# Patient Record
Sex: MALE | Race: WHITE | NOT HISPANIC OR LATINO | Employment: OTHER | URBAN - METROPOLITAN AREA
[De-identification: names, ages, dates, MRNs, and addresses within clinical notes are randomized per-mention and may not be internally consistent; named-entity substitution may affect disease eponyms.]

---

## 2017-12-14 ENCOUNTER — TRANSCRIBE ORDERS (OUTPATIENT)
Dept: ADMINISTRATIVE | Facility: HOSPITAL | Age: 77
End: 2017-12-14

## 2017-12-14 DIAGNOSIS — R06.09 DOE (DYSPNEA ON EXERTION): Primary | ICD-10-CM

## 2018-01-04 ENCOUNTER — HOSPITAL ENCOUNTER (OUTPATIENT)
Dept: CARDIOLOGY | Facility: HOSPITAL | Age: 78
Discharge: HOME | End: 2018-01-04
Attending: FAMILY MEDICINE | Admitting: FAMILY MEDICINE

## 2018-01-04 VITALS
BODY MASS INDEX: 31.5 KG/M2 | SYSTOLIC BLOOD PRESSURE: 130 MMHG | HEART RATE: 63 BPM | DIASTOLIC BLOOD PRESSURE: 88 MMHG | HEIGHT: 70 IN | WEIGHT: 220 LBS

## 2018-01-04 DIAGNOSIS — R06.09 DOE (DYSPNEA ON EXERTION): ICD-10-CM

## 2018-01-04 LAB
BH CV ECHO MEAS - AO MAX PG (FULL): 3.5 MMHG
BH CV ECHO MEAS - AO MAX PG: 5.5 MMHG
BH CV ECHO MEAS - AO MEAN PG (FULL): 2 MMHG
BH CV ECHO MEAS - AO MEAN PG: 3 MMHG
BH CV ECHO MEAS - AO ROOT AREA (BSA CORRECTED): 1.5
BH CV ECHO MEAS - AO ROOT AREA: 8.6 CM^2
BH CV ECHO MEAS - AO ROOT DIAM: 3.3 CM
BH CV ECHO MEAS - AO V2 MAX: 117 CM/SEC
BH CV ECHO MEAS - AO V2 MEAN: 83 CM/SEC
BH CV ECHO MEAS - AO V2 VTI: 22.9 CM
BH CV ECHO MEAS - AVA(I,A): 3.3 CM^2
BH CV ECHO MEAS - AVA(I,D): 3.3 CM^2
BH CV ECHO MEAS - AVA(V,A): 3 CM^2
BH CV ECHO MEAS - AVA(V,D): 3 CM^2
BH CV ECHO MEAS - BSA(HAYCOCK): 2.2 M^2
BH CV ECHO MEAS - BSA: 2.2 M^2
BH CV ECHO MEAS - BZI_BMI: 31.6 KILOGRAMS/M^2
BH CV ECHO MEAS - BZI_METRIC_HEIGHT: 177.8 CM
BH CV ECHO MEAS - BZI_METRIC_WEIGHT: 99.8 KG
BH CV ECHO MEAS - EDV(CUBED): 164.6 ML
BH CV ECHO MEAS - EDV(TEICH): 146.2 ML
BH CV ECHO MEAS - EF(CUBED): 71.4 %
BH CV ECHO MEAS - EF(TEICH): 62.5 %
BH CV ECHO MEAS - ESV(CUBED): 47 ML
BH CV ECHO MEAS - ESV(TEICH): 54.8 ML
BH CV ECHO MEAS - FS: 34.1 %
BH CV ECHO MEAS - IVS/LVPW: 1.1
BH CV ECHO MEAS - IVSD: 1.2 CM
BH CV ECHO MEAS - LA DIMENSION: 4.7 CM
BH CV ECHO MEAS - LA/AO: 1.4
BH CV ECHO MEAS - LAT PEAK E' VEL: 7.2 CM/SEC
BH CV ECHO MEAS - LV MASS(C)D: 260 GRAMS
BH CV ECHO MEAS - LV MASS(C)DI: 119.6 GRAMS/M^2
BH CV ECHO MEAS - LV MAX PG: 2 MMHG
BH CV ECHO MEAS - LV MEAN PG: 1 MMHG
BH CV ECHO MEAS - LV V1 MAX: 70.6 CM/SEC
BH CV ECHO MEAS - LV V1 MEAN: 47 CM/SEC
BH CV ECHO MEAS - LV V1 VTI: 15.4 CM
BH CV ECHO MEAS - LVIDD: 5.5 CM
BH CV ECHO MEAS - LVIDS: 3.6 CM
BH CV ECHO MEAS - LVOT AREA (M): 4.9 CM^2
BH CV ECHO MEAS - LVOT AREA: 4.9 CM^2
BH CV ECHO MEAS - LVOT DIAM: 2.5 CM
BH CV ECHO MEAS - LVPWD: 1.1 CM
BH CV ECHO MEAS - MED PEAK E' VEL: 3.89 CM/SEC
BH CV ECHO MEAS - MV A MAX VEL: 78 CM/SEC
BH CV ECHO MEAS - MV E MAX VEL: 59.2 CM/SEC
BH CV ECHO MEAS - MV E/A: 0.76
BH CV ECHO MEAS - PA ACC SLOPE: 787 CM/SEC^2
BH CV ECHO MEAS - PA ACC TIME: 0.12 SEC
BH CV ECHO MEAS - PA MAX PG: 7.8 MMHG
BH CV ECHO MEAS - PA PR(ACCEL): 25 MMHG
BH CV ECHO MEAS - PA V2 MAX: 140 CM/SEC
BH CV ECHO MEAS - SI(AO): 90.1 ML/M^2
BH CV ECHO MEAS - SI(CUBED): 54.1 ML/M^2
BH CV ECHO MEAS - SI(LVOT): 34.8 ML/M^2
BH CV ECHO MEAS - SI(TEICH): 42 ML/M^2
BH CV ECHO MEAS - SV(AO): 195.9 ML
BH CV ECHO MEAS - SV(CUBED): 117.5 ML
BH CV ECHO MEAS - SV(LVOT): 75.6 ML
BH CV ECHO MEAS - SV(TEICH): 91.4 ML
BH CV ECHO MEAS - TAPSE (>1.6): 2.4 CM2
BH CV STRESS BP STAGE 1: NORMAL
BH CV STRESS DURATION MIN STAGE 1: 3
BH CV STRESS DURATION MIN STAGE 2: 3
BH CV STRESS DURATION SEC STAGE 1: 0
BH CV STRESS DURATION SEC STAGE 2: 0
BH CV STRESS ECHO POST STRESS EJECTION FRACTION EF: 45 %
BH CV STRESS GRADE STAGE 1: 10
BH CV STRESS GRADE STAGE 2: 12
BH CV STRESS HR STAGE 1: 116
BH CV STRESS HR STAGE 2: 137
BH CV STRESS METS STAGE 1: 5
BH CV STRESS METS STAGE 2: 7.5
BH CV STRESS O2 STAGE 1: 96
BH CV STRESS O2 STAGE 2: 95
BH CV STRESS PROTOCOL 1: NORMAL
BH CV STRESS RECOVERY BP: NORMAL MMHG
BH CV STRESS RECOVERY HR: 68 BPM
BH CV STRESS RECOVERY O2: 98 %
BH CV STRESS SPEED STAGE 1: 1.7
BH CV STRESS SPEED STAGE 2: 2.5
BH CV STRESS STAGE 1: 1
BH CV STRESS STAGE 2: 2
BH CV VAS BP RIGHT ARM: NORMAL MMHG
BH CV XLRA - RV BASE: 3 CM
BH CV XLRA - RV LENGTH: 8.1 CM
BH CV XLRA - RV MID: 3.4 CM
BH CV XLRA - TDI S': 11.8 CM/SEC
E/E' RATIO: 8.2
LEFT ATRIUM VOLUME INDEX: 42 ML/M2
LV EF 2D ECHO EST: 55 %
PERCENT MAX PREDICTED HR: 95.1 %
STRESS BASELINE BP: NORMAL MMHG
STRESS BASELINE HR: 63 BPM
STRESS O2 SAT REST: 96 %
STRESS PERCENT HR: 112 %
STRESS POST ESTIMATED WORKLOAD: 7 METS
STRESS POST EXERCISE DUR MIN: 5 MIN
STRESS POST EXERCISE DUR SEC: 40 SEC
STRESS POST O2 SAT PEAK: 95 %
STRESS POST PEAK BP: NORMAL MMHG
STRESS POST PEAK HR: 136 BPM

## 2018-01-04 PROCEDURE — 93325 DOPPLER ECHO COLOR FLOW MAPG: CPT

## 2018-01-04 PROCEDURE — 93320 DOPPLER ECHO COMPLETE: CPT

## 2018-01-04 PROCEDURE — 93320 DOPPLER ECHO COMPLETE: CPT | Performed by: INTERNAL MEDICINE

## 2018-01-04 PROCEDURE — 93352 ADMIN ECG CONTRAST AGENT: CPT | Performed by: INTERNAL MEDICINE

## 2018-01-04 PROCEDURE — 93350 STRESS TTE ONLY: CPT | Performed by: INTERNAL MEDICINE

## 2018-01-04 PROCEDURE — 93325 DOPPLER ECHO COLOR FLOW MAPG: CPT | Performed by: INTERNAL MEDICINE

## 2018-01-04 PROCEDURE — 93350 STRESS TTE ONLY: CPT

## 2018-01-04 PROCEDURE — 93017 CV STRESS TEST TRACING ONLY: CPT

## 2018-01-04 PROCEDURE — 25010000002 SULFUR HEXAFLUORIDE MICROSPH 60.7-25 MG RECONSTITUTED SUSPENSION: Performed by: FAMILY MEDICINE

## 2018-01-04 PROCEDURE — 93018 CV STRESS TEST I&R ONLY: CPT | Performed by: INTERNAL MEDICINE

## 2018-01-04 RX ORDER — ATORVASTATIN CALCIUM 20 MG/1
10 TABLET, FILM COATED ORAL DAILY
COMMUNITY

## 2018-01-04 RX ORDER — MELOXICAM 15 MG/1
7.5 TABLET ORAL DAILY
COMMUNITY
End: 2020-08-04

## 2018-01-04 RX ORDER — LISINOPRIL 10 MG/1
10 TABLET ORAL DAILY
Status: ON HOLD | COMMUNITY
End: 2018-01-30

## 2018-01-04 RX ORDER — TAMSULOSIN HYDROCHLORIDE 0.4 MG/1
1 CAPSULE ORAL NIGHTLY
COMMUNITY
End: 2020-08-04

## 2018-01-04 RX ORDER — GLYBURIDE 5 MG/1
5 TABLET ORAL
COMMUNITY
End: 2020-08-04

## 2018-01-04 RX ADMIN — SULFUR HEXAFLUORIDE 5 ML: KIT at 14:47

## 2018-01-23 ENCOUNTER — OFFICE VISIT (OUTPATIENT)
Dept: CARDIOLOGY | Facility: CLINIC | Age: 78
End: 2018-01-23

## 2018-01-23 VITALS
HEIGHT: 71 IN | BODY MASS INDEX: 32.76 KG/M2 | HEART RATE: 60 BPM | WEIGHT: 234 LBS | SYSTOLIC BLOOD PRESSURE: 158 MMHG | DIASTOLIC BLOOD PRESSURE: 97 MMHG

## 2018-01-23 DIAGNOSIS — R94.39 ABNORMAL STRESS ECHO: Primary | ICD-10-CM

## 2018-01-23 DIAGNOSIS — R06.09 DYSPNEA ON EXERTION: ICD-10-CM

## 2018-01-23 PROCEDURE — 93000 ELECTROCARDIOGRAM COMPLETE: CPT | Performed by: INTERNAL MEDICINE

## 2018-01-23 PROCEDURE — 99204 OFFICE O/P NEW MOD 45 MIN: CPT | Performed by: INTERNAL MEDICINE

## 2018-01-23 RX ORDER — ALBUTEROL SULFATE 90 UG/1
1 AEROSOL, METERED RESPIRATORY (INHALATION) AS NEEDED
COMMUNITY
Start: 2017-12-22

## 2018-01-23 NOTE — PROGRESS NOTES
[facility] at [facility]  Consultation H&P  Scotty Gore  1940  [phone]    VISIT DATE:  01/23/2018    PCP: MEE Velez MD  [address] [address]    CC:  Chief Complaint   Patient presents with   • Abnormal stress test     New Patient    • Shortness of Breath       ASSESSMENT:   Diagnosis Plan   1. Abnormal stress echo  Case Request Cath Lab: Left Heart Cath   2. Dyspnea on exertion  Case Request Cath Lab: Left Heart Cath       PLAN:  Heart catheterization for definitive evaluation of coronary anatomy: Reassuring cardiac catheterization in 2014 however recent stress echocardiogram concerning for either ostial LAD or even potentially left main disease given drop in LV systolic function at peak exercise with anterior and apical regional wall motion abnormalities.    Chronic diastolic congestive heart failure: Diastolic dysfunction noted on resting transthoracic imaging.  May be at least partially responsible for some of his baseline dyspnea on exertion.  Continue afterload reduction.  Currently appears euvolemic.    History of Present Illness   77-year-old diabetic gentleman use had progressive dyspnea on exertion over the previous 3-4 months.  Previously had stable shortness of breath and a class II pattern but this is recently progressed.  He notices limiting dyspnea on exertion with any incline or going up even half a flight of stairs.  Denies chest discomfort.  No palpitations.  No PND or orthopnea.  He is compliant with medical therapy for diabetes and hypertension.  The pressures typically run less than 140/90 mmHg at home and his primary care physician's office.  He underwent exercise echocardiogram which revealed normal regional augmentation at rest however he developed a drop in his overall systolic function peak exercise with hypokinesis of his anterior wall and apex.    PHYSICAL EXAMINATION:  Vitals:    01/23/18 0901   BP: 158/97   BP Location: Right  "arm   Patient Position: Sitting   Pulse: 60   Weight: 106 kg (234 lb)   Height: 180.3 cm (71\")     General Appearance:    Alert, cooperative, no distress, appears stated age   Head:    Normocephalic, without obvious abnormality, atraumatic   Eyes:    conjunctiva/corneas clear, EOM's intact, fundi     benign, both eyes   Ears:    Normal TM's and external ear canals, both ears   Nose:   Nares normal, septum midline, mucosa normal, no drainage    or sinus tenderness   Throat:   Lips, mucosa, and tongue normal; teeth and gums normal   Neck:   Supple, symmetrical, trachea midline, no adenopathy;     thyroid:  no enlargement/tenderness/nodules; no carotid    bruit or JVD   Back:     Symmetric, no curvature, ROM normal, no CVA tenderness   Lungs:     Clear to auscultation bilaterally, respirations unlabored   Chest Wall:    No tenderness or deformity    Heart:    Regular rate and rhythm, S1 and S2 normal, no murmur, rub   or gallop, normal carotid impulse bilaterally without bruit.   Abdomen:     Soft, non-tender, bowel sounds active all four quadrants,     no masses, no organomegaly   Extremities:   Extremities normal, atraumatic, no cyanosis or edema   Pulses:   2+ and symmetric all extremities   Skin:   Skin color, texture, turgor normal, no rashes or lesions   Lymph nodes:   Cervical, supraclavicular, and axillary nodes normal   Neurologic:   normal strength, sensation intact     throughout       Diagnostic Data:    ECG 12 Lead  Date/Time: 1/23/2018 9:23 AM  Performed by: EDIE JAMES III  Authorized by: EDIE JAMES III   Previous ECG: no previous ECG available  Rhythm: sinus rhythm  Other findings: LVH with strain  Clinical impression: abnormal ECG          Lab Results   Component Value Date    CHLPL 135 10/09/2014    TRIG 143 10/09/2014    HDL 31 (L) 10/09/2014    LDLDIRECT 87 10/09/2014     Lab Results   Component Value Date    GLUCOSE 92 10/09/2014    BUN 16 10/09/2014    CREATININE 0.9 10/09/2014     " 10/09/2014    K 4.0 10/09/2014     10/09/2014    CO2 29 10/09/2014     Lab Results   Component Value Date    HGBA1C 7.1 (H) 10/09/2014     Lab Results   Component Value Date    WBC 6.92 10/09/2014    HGB 12.3 (L) 10/09/2014    HCT 37.6 (L) 10/09/2014     10/09/2014       PROBLEM LIST:  Patient Active Problem List   Diagnosis   • Dyspnea on exertion   • Abnormal stress echo       PAST MEDICAL HX  Past Medical History:   Diagnosis Date   • Arthritis    • Asthma    • Diabetes mellitus        Allergies  No Known Allergies    Current Medications    Current Outpatient Prescriptions:   •  atorvastatin (LIPITOR) 20 MG tablet, Take 20 mg by mouth Daily., Disp: , Rfl:   •  BREO ELLIPTA 100-25 MCG/INH aerosol powder , As Needed., Disp: , Rfl:   •  glyBURIDE (DIAbeta) 5 MG tablet, Take 5 mg by mouth Daily With Breakfast., Disp: , Rfl:   •  lisinopril (PRINIVIL,ZESTRIL) 10 MG tablet, Take 10 mg by mouth Daily., Disp: , Rfl:   •  meloxicam (MOBIC) 15 MG tablet, Take 15 mg by mouth Daily., Disp: , Rfl:   •  metFORMIN (GLUCOPHAGE) 500 MG tablet, Take 1,000 mg by mouth 2 (Two) Times a Day With Meals., Disp: , Rfl:   •  Multiple Vitamins-Minerals (ICAPS AREDS 2 PO), Take  by mouth 2 (Two) Times a Day., Disp: , Rfl:   •  tamsulosin (FLOMAX) 0.4 MG capsule 24 hr capsule, Take 1 capsule by mouth Every Night., Disp: , Rfl:   •  VENTOLIN  (90 Base) MCG/ACT inhaler, As Needed., Disp: , Rfl:          ROS  Review of Systems   Constitution: Positive for weakness. Negative for malaise/fatigue.   Cardiovascular: Positive for dyspnea on exertion and orthopnea. Negative for chest pain, irregular heartbeat, leg swelling, near-syncope, palpitations and syncope.   Respiratory: Positive for shortness of breath. Negative for cough, hemoptysis, snoring and sputum production.    Musculoskeletal: Positive for arthritis.     All other body systems reviewed and are negative    SOCIAL HX  Social History     Social History   • Marital  status:      Spouse name: N/A   • Number of children: N/A   • Years of education: N/A     Occupational History   • Not on file.     Social History Main Topics   • Smoking status: Never Smoker   • Smokeless tobacco: Never Used   • Alcohol use No   • Drug use: No   • Sexual activity: Defer     Other Topics Concern   • Not on file     Social History Narrative       FAMILY HX  History reviewed. No pertinent family history.          Bradley Sandhu III, MD, FACC

## 2018-01-26 ENCOUNTER — PREP FOR SURGERY (OUTPATIENT)
Dept: OTHER | Facility: HOSPITAL | Age: 78
End: 2018-01-26

## 2018-01-26 DIAGNOSIS — R94.39 ABNORMAL MYOCARDIAL PERFUSION STUDY: Primary | ICD-10-CM

## 2018-01-26 DIAGNOSIS — E13.65 OTHER SPECIFIED DIABETES MELLITUS WITH HYPERGLYCEMIA, WITHOUT LONG-TERM CURRENT USE OF INSULIN (HCC): ICD-10-CM

## 2018-01-26 RX ORDER — SODIUM CHLORIDE 0.9 % (FLUSH) 0.9 %
1-10 SYRINGE (ML) INJECTION AS NEEDED
Status: CANCELLED | OUTPATIENT
Start: 2018-01-26

## 2018-01-28 ENCOUNTER — APPOINTMENT (OUTPATIENT)
Dept: PREADMISSION TESTING | Facility: HOSPITAL | Age: 78
End: 2018-01-28

## 2018-01-28 LAB
ALBUMIN SERPL-MCNC: 4 G/DL (ref 3.2–4.8)
ALBUMIN/GLOB SERPL: 1.5 G/DL (ref 1.5–2.5)
ALP SERPL-CCNC: 75 U/L (ref 25–100)
ALT SERPL W P-5'-P-CCNC: 32 U/L (ref 7–40)
ANION GAP SERPL CALCULATED.3IONS-SCNC: 6 MMOL/L (ref 3–11)
AST SERPL-CCNC: 34 U/L (ref 0–33)
BILIRUB SERPL-MCNC: 0.6 MG/DL (ref 0.3–1.2)
BUN BLD-MCNC: 14 MG/DL (ref 9–23)
BUN/CREAT SERPL: 14 (ref 7–25)
CALCIUM SPEC-SCNC: 9.4 MG/DL (ref 8.7–10.4)
CHLORIDE SERPL-SCNC: 108 MMOL/L (ref 99–109)
CO2 SERPL-SCNC: 28 MMOL/L (ref 20–31)
CREAT BLD-MCNC: 1 MG/DL (ref 0.6–1.3)
DEPRECATED RDW RBC AUTO: 47.7 FL (ref 37–54)
ERYTHROCYTE [DISTWIDTH] IN BLOOD BY AUTOMATED COUNT: 15.8 % (ref 11.3–14.5)
GFR SERPL CREATININE-BSD FRML MDRD: 72 ML/MIN/1.73
GFR SERPL CREATININE-BSD FRML MDRD: 88 ML/MIN/1.73
GLOBULIN UR ELPH-MCNC: 2.7 GM/DL
GLUCOSE BLD-MCNC: 66 MG/DL (ref 70–100)
HBA1C MFR BLD: 7.8 % (ref 4.8–5.6)
HCT VFR BLD AUTO: 37.5 % (ref 38.9–50.9)
HGB BLD-MCNC: 12.4 G/DL (ref 13.1–17.5)
MCH RBC QN AUTO: 27.1 PG (ref 27–31)
MCHC RBC AUTO-ENTMCNC: 33.1 G/DL (ref 32–36)
MCV RBC AUTO: 81.9 FL (ref 80–99)
PLATELET # BLD AUTO: 170 10*3/MM3 (ref 150–450)
PMV BLD AUTO: 11.1 FL (ref 6–12)
POTASSIUM BLD-SCNC: 4.3 MMOL/L (ref 3.5–5.5)
PROT SERPL-MCNC: 6.7 G/DL (ref 5.7–8.2)
RBC # BLD AUTO: 4.58 10*6/MM3 (ref 4.2–5.76)
SODIUM BLD-SCNC: 142 MMOL/L (ref 132–146)
WBC NRBC COR # BLD: 6.06 10*3/MM3 (ref 3.5–10.8)

## 2018-01-28 PROCEDURE — 85027 COMPLETE CBC AUTOMATED: CPT | Performed by: PHYSICIAN ASSISTANT

## 2018-01-28 PROCEDURE — 80053 COMPREHEN METABOLIC PANEL: CPT | Performed by: PHYSICIAN ASSISTANT

## 2018-01-28 PROCEDURE — 83036 HEMOGLOBIN GLYCOSYLATED A1C: CPT | Performed by: PHYSICIAN ASSISTANT

## 2018-01-28 PROCEDURE — 36415 COLL VENOUS BLD VENIPUNCTURE: CPT | Performed by: PHYSICIAN ASSISTANT

## 2018-01-28 NOTE — DISCHARGE INSTRUCTIONS
The following instructions given during Pre Admission Testing visit:    Do not eat or drink anything after MN except for sips of water with your a.m. Prescription meds unless otherwise instructed by your physician.    Glasses and jewelry may be worn, but dentures must be removed prior to cath/procedure.    Leave any items you consider valuable at home.    Family members may wait in [facility] waiting area during procedure.    Bring all medications in their original containers the day of procedure.    Bring photo ID and insurance cards on the day of procedure.    Need to make arrangements for transportation prior to discharge.    The following handouts were given:     Heart Cath pathway (if applicable)   Cardiac Cath booklet published by [facility]    OR appropriate [facility] procedure booklet    If applicable, pt instructed to bring CPAP mask and tubing the day of procedure.

## 2018-01-30 ENCOUNTER — HOSPITAL ENCOUNTER (OUTPATIENT)
Facility: HOSPITAL | Age: 78
Setting detail: HOSPITAL OUTPATIENT SURGERY
Discharge: HOME | End: 2018-01-30
Attending: INTERNAL MEDICINE | Admitting: INTERNAL MEDICINE

## 2018-01-30 VITALS
OXYGEN SATURATION: 94 % | RESPIRATION RATE: 20 BRPM | TEMPERATURE: 97.8 F | BODY MASS INDEX: 33.46 KG/M2 | WEIGHT: 233.69 LBS | HEART RATE: 54 BPM | DIASTOLIC BLOOD PRESSURE: 94 MMHG | SYSTOLIC BLOOD PRESSURE: 176 MMHG | HEIGHT: 70 IN

## 2018-01-30 DIAGNOSIS — E13.65 OTHER SPECIFIED DIABETES MELLITUS WITH HYPERGLYCEMIA, WITHOUT LONG-TERM CURRENT USE OF INSULIN (HCC): ICD-10-CM

## 2018-01-30 DIAGNOSIS — R94.39 ABNORMAL MYOCARDIAL PERFUSION STUDY: ICD-10-CM

## 2018-01-30 DIAGNOSIS — R06.09 DYSPNEA ON EXERTION: ICD-10-CM

## 2018-01-30 DIAGNOSIS — R94.39 ABNORMAL STRESS ECHO: ICD-10-CM

## 2018-01-30 LAB
ARTICHOKE IGE QN: 92 MG/DL (ref 0–130)
CHOLEST SERPL-MCNC: 145 MG/DL (ref 0–200)
GLUCOSE BLDC GLUCOMTR-MCNC: 110 MG/DL (ref 70–130)
HDLC SERPL-MCNC: 43 MG/DL (ref 40–60)
TRIGL SERPL-MCNC: 91 MG/DL (ref 0–150)

## 2018-01-30 PROCEDURE — C1769 GUIDE WIRE: HCPCS | Performed by: INTERNAL MEDICINE

## 2018-01-30 PROCEDURE — G0108 DIAB MANAGE TRN  PER INDIV: HCPCS | Performed by: REGISTERED NURSE

## 2018-01-30 PROCEDURE — 36415 COLL VENOUS BLD VENIPUNCTURE: CPT

## 2018-01-30 PROCEDURE — C1894 INTRO/SHEATH, NON-LASER: HCPCS | Performed by: INTERNAL MEDICINE

## 2018-01-30 PROCEDURE — 25010000002 FENTANYL CITRATE (PF) 100 MCG/2ML SOLUTION: Performed by: INTERNAL MEDICINE

## 2018-01-30 PROCEDURE — 93458 L HRT ARTERY/VENTRICLE ANGIO: CPT | Performed by: INTERNAL MEDICINE

## 2018-01-30 PROCEDURE — 25010000002 MIDAZOLAM PER 1 MG: Performed by: INTERNAL MEDICINE

## 2018-01-30 PROCEDURE — 25010000002 HEPARIN (PORCINE) PER 1000 UNITS: Performed by: INTERNAL MEDICINE

## 2018-01-30 PROCEDURE — 82962 GLUCOSE BLOOD TEST: CPT

## 2018-01-30 PROCEDURE — 80061 LIPID PANEL: CPT | Performed by: PHYSICIAN ASSISTANT

## 2018-01-30 RX ORDER — LISINOPRIL 10 MG/1
10 TABLET ORAL 2 TIMES DAILY
Qty: 60 TABLET | Refills: 5 | Status: SHIPPED | OUTPATIENT
Start: 2018-01-30 | End: 2018-08-06 | Stop reason: SDUPTHER

## 2018-01-30 RX ORDER — OXYBUTYNIN CHLORIDE 5 MG/1
5 TABLET ORAL DAILY
COMMUNITY
End: 2020-08-04

## 2018-01-30 RX ORDER — LIDOCAINE HYDROCHLORIDE 10 MG/ML
INJECTION, SOLUTION EPIDURAL; INFILTRATION; INTRACAUDAL; PERINEURAL AS NEEDED
Status: DISCONTINUED | OUTPATIENT
Start: 2018-01-30 | End: 2018-01-30 | Stop reason: HOSPADM

## 2018-01-30 RX ORDER — FENTANYL CITRATE 50 UG/ML
INJECTION, SOLUTION INTRAMUSCULAR; INTRAVENOUS AS NEEDED
Status: DISCONTINUED | OUTPATIENT
Start: 2018-01-30 | End: 2018-01-30 | Stop reason: HOSPADM

## 2018-01-30 RX ORDER — SODIUM CHLORIDE 0.9 % (FLUSH) 0.9 %
1-10 SYRINGE (ML) INJECTION AS NEEDED
Status: DISCONTINUED | OUTPATIENT
Start: 2018-01-30 | End: 2018-01-30 | Stop reason: HOSPADM

## 2018-01-30 RX ORDER — ASPIRIN 81 MG/1
81 TABLET ORAL DAILY
COMMUNITY
End: 2020-08-04

## 2018-01-30 RX ORDER — SODIUM CHLORIDE 9 MG/ML
INJECTION, SOLUTION INTRAVENOUS CONTINUOUS PRN
Status: DISCONTINUED | OUTPATIENT
Start: 2018-01-30 | End: 2018-01-30 | Stop reason: HOSPADM

## 2018-01-30 RX ORDER — MIDAZOLAM HYDROCHLORIDE 1 MG/ML
INJECTION INTRAMUSCULAR; INTRAVENOUS AS NEEDED
Status: DISCONTINUED | OUTPATIENT
Start: 2018-01-30 | End: 2018-01-30 | Stop reason: HOSPADM

## 2018-01-31 ENCOUNTER — DOCUMENTATION (OUTPATIENT)
Dept: CARDIAC REHAB | Facility: HOSPITAL | Age: 78
End: 2018-01-31

## 2018-08-06 RX ORDER — LISINOPRIL 10 MG/1
10 TABLET ORAL 2 TIMES DAILY
Qty: 180 TABLET | Refills: 0 | Status: SHIPPED | OUTPATIENT
Start: 2018-08-06 | End: 2018-10-09 | Stop reason: SDUPTHER

## 2018-10-10 RX ORDER — LISINOPRIL 10 MG/1
TABLET ORAL
Qty: 60 TABLET | Refills: 5 | Status: SHIPPED | OUTPATIENT
Start: 2018-10-10 | End: 2020-08-04

## 2020-08-04 ENCOUNTER — APPOINTMENT (OUTPATIENT)
Dept: PREADMISSION TESTING | Facility: HOSPITAL | Age: 80
End: 2020-08-04

## 2020-08-04 ENCOUNTER — HOSPITAL ENCOUNTER (OUTPATIENT)
Dept: GENERAL RADIOLOGY | Facility: HOSPITAL | Age: 80
Discharge: HOME | End: 2020-08-04
Admitting: ORTHOPAEDIC SURGERY

## 2020-08-04 VITALS — BODY MASS INDEX: 31.64 KG/M2 | WEIGHT: 221 LBS | HEIGHT: 70 IN

## 2020-08-04 LAB
ALBUMIN SERPL-MCNC: 4.1 G/DL (ref 3.5–5.2)
ALBUMIN/GLOB SERPL: 1.7 G/DL
ALP SERPL-CCNC: 80 U/L (ref 39–117)
ALT SERPL W P-5'-P-CCNC: 24 U/L (ref 1–41)
ANION GAP SERPL CALCULATED.3IONS-SCNC: 9 MMOL/L (ref 5–15)
APTT PPP: 28.9 SECONDS (ref 24–37)
AST SERPL-CCNC: 30 U/L (ref 1–40)
BASOPHILS # BLD AUTO: 0.04 10*3/MM3 (ref 0–0.2)
BASOPHILS NFR BLD AUTO: 0.8 % (ref 0–1.5)
BILIRUB SERPL-MCNC: 0.3 MG/DL (ref 0–1.2)
BILIRUB UR QL STRIP: NEGATIVE
BUN SERPL-MCNC: 17 MG/DL (ref 8–23)
BUN/CREAT SERPL: 13.3 (ref 7–25)
CALCIUM SPEC-SCNC: 9.6 MG/DL (ref 8.6–10.5)
CHLORIDE SERPL-SCNC: 106 MMOL/L (ref 98–107)
CLARITY UR: CLEAR
CO2 SERPL-SCNC: 25 MMOL/L (ref 22–29)
COLOR UR: YELLOW
CREAT SERPL-MCNC: 1.28 MG/DL (ref 0.76–1.27)
DEPRECATED RDW RBC AUTO: 47.9 FL (ref 37–54)
EOSINOPHIL # BLD AUTO: 0.14 10*3/MM3 (ref 0–0.4)
EOSINOPHIL NFR BLD AUTO: 2.7 % (ref 0.3–6.2)
ERYTHROCYTE [DISTWIDTH] IN BLOOD BY AUTOMATED COUNT: 14.5 % (ref 12.3–15.4)
GFR SERPL CREATININE-BSD FRML MDRD: 54 ML/MIN/1.73
GFR SERPL CREATININE-BSD FRML MDRD: 66 ML/MIN/1.73
GLOBULIN UR ELPH-MCNC: 2.4 GM/DL
GLUCOSE SERPL-MCNC: 156 MG/DL (ref 65–99)
GLUCOSE UR STRIP-MCNC: NEGATIVE MG/DL
HBA1C MFR BLD: 9.9 % (ref 4.8–5.6)
HCT VFR BLD AUTO: 40.9 % (ref 37.5–51)
HGB BLD-MCNC: 13 G/DL (ref 13–17.7)
HGB UR QL STRIP.AUTO: NEGATIVE
IMM GRANULOCYTES # BLD AUTO: 0.03 10*3/MM3 (ref 0–0.05)
IMM GRANULOCYTES NFR BLD AUTO: 0.6 % (ref 0–0.5)
INR PPP: 1.11 (ref 0.85–1.16)
KETONES UR QL STRIP: ABNORMAL
LEUKOCYTE ESTERASE UR QL STRIP.AUTO: NEGATIVE
LYMPHOCYTES # BLD AUTO: 1.27 10*3/MM3 (ref 0.7–3.1)
LYMPHOCYTES NFR BLD AUTO: 24.5 % (ref 19.6–45.3)
MCH RBC QN AUTO: 28.8 PG (ref 26.6–33)
MCHC RBC AUTO-ENTMCNC: 31.8 G/DL (ref 31.5–35.7)
MCV RBC AUTO: 90.5 FL (ref 79–97)
MONOCYTES # BLD AUTO: 0.52 10*3/MM3 (ref 0.1–0.9)
MONOCYTES NFR BLD AUTO: 10 % (ref 5–12)
NEUTROPHILS NFR BLD AUTO: 3.19 10*3/MM3 (ref 1.7–7)
NEUTROPHILS NFR BLD AUTO: 61.4 % (ref 42.7–76)
NITRITE UR QL STRIP: NEGATIVE
NRBC BLD AUTO-RTO: 0 /100 WBC (ref 0–0.2)
PH UR STRIP.AUTO: <=5 [PH] (ref 5–8)
PLATELET # BLD AUTO: 167 10*3/MM3 (ref 140–450)
PMV BLD AUTO: 11.3 FL (ref 6–12)
POTASSIUM SERPL-SCNC: 4.6 MMOL/L (ref 3.5–5.2)
PROT SERPL-MCNC: 6.5 G/DL (ref 6–8.5)
PROT UR QL STRIP: NEGATIVE
PROTHROMBIN TIME: 14 SECONDS (ref 11.5–14)
RBC # BLD AUTO: 4.52 10*6/MM3 (ref 4.14–5.8)
SODIUM SERPL-SCNC: 140 MMOL/L (ref 136–145)
SP GR UR STRIP: 1.03 (ref 1–1.03)
UROBILINOGEN UR QL STRIP: ABNORMAL
WBC # BLD AUTO: 5.19 10*3/MM3 (ref 3.4–10.8)

## 2020-08-04 PROCEDURE — 93005 ELECTROCARDIOGRAM TRACING: CPT

## 2020-08-04 PROCEDURE — 85025 COMPLETE CBC W/AUTO DIFF WBC: CPT | Performed by: ORTHOPAEDIC SURGERY

## 2020-08-04 PROCEDURE — 85610 PROTHROMBIN TIME: CPT | Performed by: ORTHOPAEDIC SURGERY

## 2020-08-04 PROCEDURE — 83036 HEMOGLOBIN GLYCOSYLATED A1C: CPT | Performed by: ORTHOPAEDIC SURGERY

## 2020-08-04 PROCEDURE — 85730 THROMBOPLASTIN TIME PARTIAL: CPT | Performed by: ORTHOPAEDIC SURGERY

## 2020-08-04 PROCEDURE — 81003 URINALYSIS AUTO W/O SCOPE: CPT | Performed by: ORTHOPAEDIC SURGERY

## 2020-08-04 PROCEDURE — 71046 X-RAY EXAM CHEST 2 VIEWS: CPT

## 2020-08-04 PROCEDURE — 80053 COMPREHEN METABOLIC PANEL: CPT | Performed by: ORTHOPAEDIC SURGERY

## 2020-08-04 PROCEDURE — 36415 COLL VENOUS BLD VENIPUNCTURE: CPT

## 2020-08-04 PROCEDURE — 93010 ELECTROCARDIOGRAM REPORT: CPT | Performed by: INTERNAL MEDICINE

## 2020-08-04 NOTE — PAT
Patient given a prescription for Benzol Peroxide 5% wash during PAT visit.  Instructed them to fill the prescription or  from [facility] pharmacy if was submitted electronically by the surgeon's office.  Verbal and written instructions given regarding proper use of the Benzoyl Peroxide wash given to patient and/or famlily during PAT visit. Patient/family also instructed to complete checklist and return it to Pre-op on the day of surgery.  Patient and/or family verbalized understanding.      Additionally, reinforced with patient to acquire this prescription from the [facility] retail pharmacy before leaving the hospital after PAT visit due to the potential unavailability at local pharmacies.    Patient instructed to drink 20 ounces (or until full) of Gatorade and it needs to be completed 1 hour before given arrival time for procedure (NO RED Gatorade)    Patient verbalized understanding.

## 2020-08-07 NOTE — PAT
Office called back and stated they will proceed with surgery despite elevated ha1c because Dr Raphael believes the benefits outweigh the risks because the patient is unable to move arm.  Thus surgery will proceed.

## 2020-08-10 ENCOUNTER — HOSPITAL ENCOUNTER (INPATIENT)
Facility: HOSPITAL | Age: 80
LOS: 1 days | Discharge: HOME | End: 2020-08-14
Attending: ORTHOPAEDIC SURGERY | Admitting: ORTHOPAEDIC SURGERY

## 2020-08-10 ENCOUNTER — APPOINTMENT (OUTPATIENT)
Dept: PREADMISSION TESTING | Facility: HOSPITAL | Age: 80
End: 2020-08-10

## 2020-08-10 DIAGNOSIS — M12.811 ROTATOR CUFF ARTHROPATHY, RIGHT: Primary | ICD-10-CM

## 2020-08-10 DIAGNOSIS — Z78.9 IMPAIRED MOBILITY AND ADLS: ICD-10-CM

## 2020-08-10 DIAGNOSIS — Z74.09 IMPAIRED MOBILITY AND ADLS: ICD-10-CM

## 2020-08-10 PROCEDURE — U0002 COVID-19 LAB TEST NON-CDC: HCPCS

## 2020-08-10 PROCEDURE — C9803 HOPD COVID-19 SPEC COLLECT: HCPCS

## 2020-08-11 LAB
REF LAB TEST METHOD: NORMAL
SARS-COV-2 RNA RESP QL NAA+PROBE: NOT DETECTED

## 2020-08-12 ENCOUNTER — ANESTHESIA EVENT (OUTPATIENT)
Dept: PERIOP | Facility: HOSPITAL | Age: 80
End: 2020-08-12

## 2020-08-13 ENCOUNTER — APPOINTMENT (OUTPATIENT)
Dept: GENERAL RADIOLOGY | Facility: HOSPITAL | Age: 80
End: 2020-08-13

## 2020-08-13 ENCOUNTER — ANESTHESIA (OUTPATIENT)
Dept: PERIOP | Facility: HOSPITAL | Age: 80
End: 2020-08-13

## 2020-08-13 PROBLEM — E78.5 HYPERLIPIDEMIA: Status: ACTIVE | Noted: 2020-08-13

## 2020-08-13 PROBLEM — IMO0002 UNCONTROLLED DIABETES MELLITUS: Status: ACTIVE | Noted: 2020-08-13

## 2020-08-13 PROBLEM — I10 HYPERTENSION: Status: ACTIVE | Noted: 2020-08-13

## 2020-08-13 PROBLEM — M12.811 ROTATOR CUFF ARTHROPATHY, RIGHT: Status: ACTIVE | Noted: 2020-08-13

## 2020-08-13 PROBLEM — N28.9 RENAL INSUFFICIENCY: Status: ACTIVE | Noted: 2020-08-13

## 2020-08-13 LAB
GLUCOSE BLDC GLUCOMTR-MCNC: 215 MG/DL (ref 70–130)
GLUCOSE BLDC GLUCOMTR-MCNC: 223 MG/DL (ref 70–130)
GLUCOSE BLDC GLUCOMTR-MCNC: 234 MG/DL (ref 70–130)
GLUCOSE BLDC GLUCOMTR-MCNC: 323 MG/DL (ref 70–130)

## 2020-08-13 PROCEDURE — 63710000001 INSULIN LISPRO (HUMAN) PER 5 UNITS: Performed by: NURSE PRACTITIONER

## 2020-08-13 PROCEDURE — 25010000002 DEXAMETHASONE PER 1 MG: Performed by: NURSE ANESTHETIST, CERTIFIED REGISTERED

## 2020-08-13 PROCEDURE — 0RRJ00Z REPLACEMENT OF RIGHT SHOULDER JOINT WITH REVERSE BALL AND SOCKET SYNTHETIC SUBSTITUTE, OPEN APPROACH: ICD-10-PCS | Performed by: ORTHOPAEDIC SURGERY

## 2020-08-13 PROCEDURE — 94799 UNLISTED PULMONARY SVC/PX: CPT

## 2020-08-13 PROCEDURE — 25010000002 PROPOFOL 10 MG/ML EMULSION: Performed by: NURSE ANESTHETIST, CERTIFIED REGISTERED

## 2020-08-13 PROCEDURE — 76942 ECHO GUIDE FOR BIOPSY: CPT | Performed by: ORTHOPAEDIC SURGERY

## 2020-08-13 PROCEDURE — 73020 X-RAY EXAM OF SHOULDER: CPT

## 2020-08-13 PROCEDURE — 63710000001 INSULIN DETEMIR PER 5 UNITS: Performed by: NURSE PRACTITIONER

## 2020-08-13 PROCEDURE — 25010000003 LIDOCAINE 1 % SOLUTION: Performed by: NURSE ANESTHETIST, CERTIFIED REGISTERED

## 2020-08-13 PROCEDURE — 25010000003 CEFAZOLIN IN DEXTROSE 2-4 GM/100ML-% SOLUTION: Performed by: ORTHOPAEDIC SURGERY

## 2020-08-13 PROCEDURE — 25010000002 NEOSTIGMINE 10 MG/10ML SOLUTION: Performed by: NURSE ANESTHETIST, CERTIFIED REGISTERED

## 2020-08-13 PROCEDURE — 82962 GLUCOSE BLOOD TEST: CPT

## 2020-08-13 PROCEDURE — C1776 JOINT DEVICE (IMPLANTABLE): HCPCS | Performed by: ORTHOPAEDIC SURGERY

## 2020-08-13 PROCEDURE — 25010000002 VANCOMYCIN 1 G RECONSTITUTED SOLUTION: Performed by: ORTHOPAEDIC SURGERY

## 2020-08-13 PROCEDURE — 25010000002 DEXAMETHASONE SODIUM PHOSPHATE 10 MG/ML SOLUTION: Performed by: NURSE ANESTHETIST, CERTIFIED REGISTERED

## 2020-08-13 PROCEDURE — 25010000002 ONDANSETRON PER 1 MG: Performed by: NURSE ANESTHETIST, CERTIFIED REGISTERED

## 2020-08-13 PROCEDURE — L3670 SO ACRO/CLAV CAN WEB PRE OTS: HCPCS | Performed by: ORTHOPAEDIC SURGERY

## 2020-08-13 PROCEDURE — 25010000002 ROPIVACAINE PER 1 MG: Performed by: NURSE ANESTHETIST, CERTIFIED REGISTERED

## 2020-08-13 PROCEDURE — 94640 AIRWAY INHALATION TREATMENT: CPT

## 2020-08-13 DEVICE — IMPLANTABLE DEVICE: Type: IMPLANTABLE DEVICE | Site: SHOULDER | Status: FUNCTIONAL

## 2020-08-13 DEVICE — LINER HUM UNIVERS REVERS MD 39 PLS3MM: Type: IMPLANTABLE DEVICE | Site: SHOULDER | Status: FUNCTIONAL

## 2020-08-13 DEVICE — SCRW NL GLEN UNIVERS REVERS PERIPH 4.5X40MM: Type: IMPLANTABLE DEVICE | Site: SHOULDER | Status: FUNCTIONAL

## 2020-08-13 DEVICE — STEM HUM/SHLDR UNIVERS REVERS APEX SZ12: Type: IMPLANTABLE DEVICE | Site: SHOULDER | Status: FUNCTIONAL

## 2020-08-13 DEVICE — ABSORBABLE HEMOSTAT (OXIDIZED REGENERATED CELLULOSE, U.S.P.)
Type: IMPLANTABLE DEVICE | Site: SHOULDER | Status: FUNCTIONAL
Brand: SURGICEL

## 2020-08-13 DEVICE — GLENOSPHERE UNIVERS REVERS MODULAR L/24 39PLS4: Type: IMPLANTABLE DEVICE | Site: SHOULDER | Status: FUNCTIONAL

## 2020-08-13 DEVICE — SCRW LK GLEN UNIVERS REVERS PERIPH 5.5X24MM: Type: IMPLANTABLE DEVICE | Site: SHOULDER | Status: FUNCTIONAL

## 2020-08-13 DEVICE — SCRW CENTRL GLEN UNIVERS REVERS 20MM: Type: IMPLANTABLE DEVICE | Site: SHOULDER | Status: FUNCTIONAL

## 2020-08-13 DEVICE — SCRW NL GLEN UNIVERS REVERS PERIPH 4.5X36MM: Type: IMPLANTABLE DEVICE | Site: SHOULDER | Status: FUNCTIONAL

## 2020-08-13 DEVICE — SCRW LK GLEN UNIVERS REVERS PERIPH 5.5X32MM: Type: IMPLANTABLE DEVICE | Site: SHOULDER | Status: FUNCTIONAL

## 2020-08-13 DEVICE — CUP SUT UNIVERS REVERS 39 NTRL: Type: IMPLANTABLE DEVICE | Site: SHOULDER | Status: FUNCTIONAL

## 2020-08-13 RX ORDER — FAMOTIDINE 10 MG/ML
20 INJECTION, SOLUTION INTRAVENOUS ONCE
Status: DISCONTINUED | OUTPATIENT
Start: 2020-08-13 | End: 2020-08-13 | Stop reason: HOSPADM

## 2020-08-13 RX ORDER — MONTELUKAST SODIUM 10 MG/1
10 TABLET ORAL NIGHTLY
COMMUNITY

## 2020-08-13 RX ORDER — HYDROMORPHONE HCL 110MG/55ML
0.5 PATIENT CONTROLLED ANALGESIA SYRINGE INTRAVENOUS
Status: DISCONTINUED | OUTPATIENT
Start: 2020-08-13 | End: 2020-08-14 | Stop reason: HOSPADM

## 2020-08-13 RX ORDER — VANCOMYCIN HYDROCHLORIDE 1 G/20ML
INJECTION, POWDER, LYOPHILIZED, FOR SOLUTION INTRAVENOUS AS NEEDED
Status: DISCONTINUED | OUTPATIENT
Start: 2020-08-13 | End: 2020-08-13 | Stop reason: HOSPADM

## 2020-08-13 RX ORDER — PROPOFOL 10 MG/ML
VIAL (ML) INTRAVENOUS CONTINUOUS PRN
Status: DISCONTINUED | OUTPATIENT
Start: 2020-08-13 | End: 2020-08-13 | Stop reason: SURG

## 2020-08-13 RX ORDER — DEXAMETHASONE SODIUM PHOSPHATE 10 MG/ML
INJECTION, SOLUTION INTRAMUSCULAR; INTRAVENOUS
Status: COMPLETED | OUTPATIENT
Start: 2020-08-13 | End: 2020-08-13

## 2020-08-13 RX ORDER — ATORVASTATIN CALCIUM 10 MG/1
10 TABLET, FILM COATED ORAL DAILY
Status: DISCONTINUED | OUTPATIENT
Start: 2020-08-13 | End: 2020-08-14 | Stop reason: HOSPADM

## 2020-08-13 RX ORDER — ONDANSETRON 2 MG/ML
4 INJECTION INTRAMUSCULAR; INTRAVENOUS EVERY 6 HOURS PRN
Status: DISCONTINUED | OUTPATIENT
Start: 2020-08-13 | End: 2020-08-14 | Stop reason: HOSPADM

## 2020-08-13 RX ORDER — ACETAMINOPHEN 650 MG
TABLET, EXTENDED RELEASE ORAL AS NEEDED
Status: DISCONTINUED | OUTPATIENT
Start: 2020-08-13 | End: 2020-08-13 | Stop reason: HOSPADM

## 2020-08-13 RX ORDER — NICOTINE POLACRILEX 4 MG
15 LOZENGE BUCCAL
Status: DISCONTINUED | OUTPATIENT
Start: 2020-08-13 | End: 2020-08-14 | Stop reason: HOSPADM

## 2020-08-13 RX ORDER — TAMSULOSIN HYDROCHLORIDE 0.4 MG/1
0.4 CAPSULE ORAL DAILY
Status: DISCONTINUED | OUTPATIENT
Start: 2020-08-13 | End: 2020-08-14 | Stop reason: HOSPADM

## 2020-08-13 RX ORDER — ACETAMINOPHEN 500 MG
1000 TABLET ORAL ONCE
Status: COMPLETED | OUTPATIENT
Start: 2020-08-13 | End: 2020-08-13

## 2020-08-13 RX ORDER — LISINOPRIL 20 MG/1
20 TABLET ORAL 2 TIMES DAILY
COMMUNITY

## 2020-08-13 RX ORDER — FENTANYL CITRATE 50 UG/ML
50 INJECTION, SOLUTION INTRAMUSCULAR; INTRAVENOUS
Status: DISCONTINUED | OUTPATIENT
Start: 2020-08-13 | End: 2020-08-13 | Stop reason: HOSPADM

## 2020-08-13 RX ORDER — ONDANSETRON 2 MG/ML
INJECTION INTRAMUSCULAR; INTRAVENOUS AS NEEDED
Status: DISCONTINUED | OUTPATIENT
Start: 2020-08-13 | End: 2020-08-13 | Stop reason: SURG

## 2020-08-13 RX ORDER — SODIUM CHLORIDE 0.9 % (FLUSH) 0.9 %
10 SYRINGE (ML) INJECTION EVERY 12 HOURS SCHEDULED
Status: DISCONTINUED | OUTPATIENT
Start: 2020-08-13 | End: 2020-08-13 | Stop reason: HOSPADM

## 2020-08-13 RX ORDER — SODIUM CHLORIDE, SODIUM LACTATE, POTASSIUM CHLORIDE, CALCIUM CHLORIDE 600; 310; 30; 20 MG/100ML; MG/100ML; MG/100ML; MG/100ML
INJECTION, SOLUTION INTRAVENOUS CONTINUOUS PRN
Status: DISCONTINUED | OUTPATIENT
Start: 2020-08-13 | End: 2020-08-13 | Stop reason: SURG

## 2020-08-13 RX ORDER — BUPIVACAINE HYDROCHLORIDE 2.5 MG/ML
INJECTION, SOLUTION EPIDURAL; INFILTRATION; INTRACAUDAL
Status: COMPLETED | OUTPATIENT
Start: 2020-08-13 | End: 2020-08-13

## 2020-08-13 RX ORDER — SODIUM CHLORIDE 0.9 % (FLUSH) 0.9 %
10 SYRINGE (ML) INJECTION AS NEEDED
Status: DISCONTINUED | OUTPATIENT
Start: 2020-08-13 | End: 2020-08-13 | Stop reason: HOSPADM

## 2020-08-13 RX ORDER — LABETALOL HYDROCHLORIDE 5 MG/ML
10 INJECTION, SOLUTION INTRAVENOUS EVERY 4 HOURS PRN
Status: DISCONTINUED | OUTPATIENT
Start: 2020-08-13 | End: 2020-08-14 | Stop reason: HOSPADM

## 2020-08-13 RX ORDER — ACETAMINOPHEN 650 MG/1
650 SUPPOSITORY RECTAL EVERY 4 HOURS PRN
Status: DISCONTINUED | OUTPATIENT
Start: 2020-08-13 | End: 2020-08-14 | Stop reason: HOSPADM

## 2020-08-13 RX ORDER — OXYCODONE HYDROCHLORIDE 5 MG/1
5 TABLET ORAL EVERY 4 HOURS PRN
Status: DISCONTINUED | OUTPATIENT
Start: 2020-08-13 | End: 2020-08-14 | Stop reason: HOSPADM

## 2020-08-13 RX ORDER — FAMOTIDINE 20 MG/1
20 TABLET, FILM COATED ORAL ONCE
Status: COMPLETED | OUTPATIENT
Start: 2020-08-13 | End: 2020-08-13

## 2020-08-13 RX ORDER — ONDANSETRON 4 MG/1
4 TABLET, FILM COATED ORAL EVERY 6 HOURS PRN
Status: DISCONTINUED | OUTPATIENT
Start: 2020-08-13 | End: 2020-08-14 | Stop reason: HOSPADM

## 2020-08-13 RX ORDER — NEOSTIGMINE METHYLSULFATE 1 MG/ML
INJECTION, SOLUTION INTRAVENOUS AS NEEDED
Status: DISCONTINUED | OUTPATIENT
Start: 2020-08-13 | End: 2020-08-13 | Stop reason: SURG

## 2020-08-13 RX ORDER — OXYBUTYNIN CHLORIDE 10 MG/1
10 TABLET, EXTENDED RELEASE ORAL DAILY
COMMUNITY
End: 2021-06-22

## 2020-08-13 RX ORDER — SODIUM CHLORIDE, SODIUM LACTATE, POTASSIUM CHLORIDE, CALCIUM CHLORIDE 600; 310; 30; 20 MG/100ML; MG/100ML; MG/100ML; MG/100ML
9 INJECTION, SOLUTION INTRAVENOUS CONTINUOUS
Status: DISCONTINUED | OUTPATIENT
Start: 2020-08-13 | End: 2020-08-13

## 2020-08-13 RX ORDER — OXYCODONE HYDROCHLORIDE 5 MG/1
10 TABLET ORAL EVERY 4 HOURS PRN
Status: DISCONTINUED | OUTPATIENT
Start: 2020-08-13 | End: 2020-08-14 | Stop reason: HOSPADM

## 2020-08-13 RX ORDER — BUDESONIDE AND FORMOTEROL FUMARATE DIHYDRATE 80; 4.5 UG/1; UG/1
2 AEROSOL RESPIRATORY (INHALATION)
Status: DISCONTINUED | OUTPATIENT
Start: 2020-08-13 | End: 2020-08-14 | Stop reason: HOSPADM

## 2020-08-13 RX ORDER — MONTELUKAST SODIUM 10 MG/1
10 TABLET ORAL NIGHTLY
Status: DISCONTINUED | OUTPATIENT
Start: 2020-08-13 | End: 2020-08-14 | Stop reason: HOSPADM

## 2020-08-13 RX ORDER — PREGABALIN 75 MG/1
75 CAPSULE ORAL ONCE
Status: COMPLETED | OUTPATIENT
Start: 2020-08-13 | End: 2020-08-13

## 2020-08-13 RX ORDER — LIDOCAINE HYDROCHLORIDE 10 MG/ML
INJECTION, SOLUTION INFILTRATION; PERINEURAL AS NEEDED
Status: DISCONTINUED | OUTPATIENT
Start: 2020-08-13 | End: 2020-08-13 | Stop reason: SURG

## 2020-08-13 RX ORDER — MAGNESIUM HYDROXIDE 1200 MG/15ML
LIQUID ORAL AS NEEDED
Status: DISCONTINUED | OUTPATIENT
Start: 2020-08-13 | End: 2020-08-13 | Stop reason: HOSPADM

## 2020-08-13 RX ORDER — ACETAMINOPHEN 325 MG/1
650 TABLET ORAL EVERY 4 HOURS PRN
Status: DISCONTINUED | OUTPATIENT
Start: 2020-08-13 | End: 2020-08-14 | Stop reason: HOSPADM

## 2020-08-13 RX ORDER — GLYCOPYRROLATE 0.2 MG/ML
INJECTION INTRAMUSCULAR; INTRAVENOUS AS NEEDED
Status: DISCONTINUED | OUTPATIENT
Start: 2020-08-13 | End: 2020-08-13 | Stop reason: SURG

## 2020-08-13 RX ORDER — SODIUM CHLORIDE 450 MG/100ML
50 INJECTION, SOLUTION INTRAVENOUS CONTINUOUS
Status: DISCONTINUED | OUTPATIENT
Start: 2020-08-13 | End: 2020-08-14 | Stop reason: HOSPADM

## 2020-08-13 RX ORDER — PROPOFOL 10 MG/ML
VIAL (ML) INTRAVENOUS AS NEEDED
Status: DISCONTINUED | OUTPATIENT
Start: 2020-08-13 | End: 2020-08-13 | Stop reason: SURG

## 2020-08-13 RX ORDER — ROCURONIUM BROMIDE 10 MG/ML
INJECTION, SOLUTION INTRAVENOUS AS NEEDED
Status: DISCONTINUED | OUTPATIENT
Start: 2020-08-13 | End: 2020-08-13 | Stop reason: SURG

## 2020-08-13 RX ORDER — GLYBURIDE 5 MG/1
5 TABLET ORAL
COMMUNITY

## 2020-08-13 RX ORDER — OXYBUTYNIN CHLORIDE 10 MG/1
10 TABLET, EXTENDED RELEASE ORAL DAILY
Status: DISCONTINUED | OUTPATIENT
Start: 2020-08-13 | End: 2020-08-14 | Stop reason: HOSPADM

## 2020-08-13 RX ORDER — DEXTROSE MONOHYDRATE 25 G/50ML
25 INJECTION, SOLUTION INTRAVENOUS
Status: DISCONTINUED | OUTPATIENT
Start: 2020-08-13 | End: 2020-08-14 | Stop reason: HOSPADM

## 2020-08-13 RX ORDER — MELOXICAM 7.5 MG/1
7.5 TABLET ORAL DAILY
COMMUNITY
End: 2020-08-14 | Stop reason: HOSPADM

## 2020-08-13 RX ORDER — CEFAZOLIN SODIUM 2 G/100ML
2 INJECTION, SOLUTION INTRAVENOUS ONCE
Status: COMPLETED | OUTPATIENT
Start: 2020-08-13 | End: 2020-08-13

## 2020-08-13 RX ORDER — ONDANSETRON 2 MG/ML
4 INJECTION INTRAMUSCULAR; INTRAVENOUS ONCE AS NEEDED
Status: DISCONTINUED | OUTPATIENT
Start: 2020-08-13 | End: 2020-08-13 | Stop reason: HOSPADM

## 2020-08-13 RX ORDER — NALOXONE HCL 0.4 MG/ML
0.1 VIAL (ML) INJECTION
Status: DISCONTINUED | OUTPATIENT
Start: 2020-08-13 | End: 2020-08-14 | Stop reason: HOSPADM

## 2020-08-13 RX ORDER — TAMSULOSIN HYDROCHLORIDE 0.4 MG/1
1 CAPSULE ORAL DAILY
COMMUNITY

## 2020-08-13 RX ORDER — DEXAMETHASONE SODIUM PHOSPHATE 4 MG/ML
INJECTION, SOLUTION INTRA-ARTICULAR; INTRALESIONAL; INTRAMUSCULAR; INTRAVENOUS; SOFT TISSUE AS NEEDED
Status: DISCONTINUED | OUTPATIENT
Start: 2020-08-13 | End: 2020-08-13 | Stop reason: SURG

## 2020-08-13 RX ORDER — CEFAZOLIN SODIUM 2 G/100ML
2 INJECTION, SOLUTION INTRAVENOUS EVERY 8 HOURS
Status: COMPLETED | OUTPATIENT
Start: 2020-08-13 | End: 2020-08-14

## 2020-08-13 RX ORDER — LIDOCAINE HYDROCHLORIDE 10 MG/ML
0.5 INJECTION, SOLUTION EPIDURAL; INFILTRATION; INTRACAUDAL; PERINEURAL ONCE AS NEEDED
Status: COMPLETED | OUTPATIENT
Start: 2020-08-13 | End: 2020-08-13

## 2020-08-13 RX ADMIN — ROCURONIUM BROMIDE 50 MG: 10 INJECTION INTRAVENOUS at 13:18

## 2020-08-13 RX ADMIN — PREGABALIN 75 MG: 75 CAPSULE ORAL at 11:35

## 2020-08-13 RX ADMIN — INSULIN DETEMIR 10 UNITS: 100 INJECTION, SOLUTION SUBCUTANEOUS at 20:57

## 2020-08-13 RX ADMIN — FAMOTIDINE 20 MG: 20 TABLET, FILM COATED ORAL at 11:35

## 2020-08-13 RX ADMIN — GLYCOPYRROLATE 0.4 MG: 0.2 INJECTION INTRAMUSCULAR; INTRAVENOUS at 15:03

## 2020-08-13 RX ADMIN — ACETAMINOPHEN 1000 MG: 500 TABLET ORAL at 11:35

## 2020-08-13 RX ADMIN — BUPIVACAINE HYDROCHLORIDE 30 ML: 2.5 INJECTION, SOLUTION EPIDURAL; INFILTRATION; INTRACAUDAL; PERINEURAL at 13:20

## 2020-08-13 RX ADMIN — TRANEXAMIC ACID 1000 MG: 100 INJECTION, SOLUTION INTRAVENOUS at 14:30

## 2020-08-13 RX ADMIN — CEFAZOLIN SODIUM 2 G: 2 INJECTION, SOLUTION INTRAVENOUS at 13:16

## 2020-08-13 RX ADMIN — ROPIVACAINE HYDROCHLORIDE 6 ML/HR: 5 INJECTION, SOLUTION EPIDURAL; INFILTRATION; PERINEURAL at 15:25

## 2020-08-13 RX ADMIN — OXYBUTYNIN CHLORIDE 10 MG: 10 TABLET, EXTENDED RELEASE ORAL at 17:03

## 2020-08-13 RX ADMIN — EPHEDRINE SULFATE 20 MG: 50 INJECTION INTRAMUSCULAR; INTRAVENOUS; SUBCUTANEOUS at 13:39

## 2020-08-13 RX ADMIN — ATORVASTATIN CALCIUM 10 MG: 10 TABLET, FILM COATED ORAL at 17:03

## 2020-08-13 RX ADMIN — MONTELUKAST SODIUM 10 MG: 10 TABLET, COATED ORAL at 20:55

## 2020-08-13 RX ADMIN — SODIUM CHLORIDE, POTASSIUM CHLORIDE, SODIUM LACTATE AND CALCIUM CHLORIDE: 600; 310; 30; 20 INJECTION, SOLUTION INTRAVENOUS at 13:12

## 2020-08-13 RX ADMIN — SODIUM CHLORIDE 50 ML/HR: 4.5 INJECTION, SOLUTION INTRAVENOUS at 16:55

## 2020-08-13 RX ADMIN — SODIUM CHLORIDE, POTASSIUM CHLORIDE, SODIUM LACTATE AND CALCIUM CHLORIDE: 600; 310; 30; 20 INJECTION, SOLUTION INTRAVENOUS at 15:08

## 2020-08-13 RX ADMIN — NEOSTIGMINE 3 MG: 1 INJECTION INTRAVENOUS at 15:03

## 2020-08-13 RX ADMIN — CEFAZOLIN SODIUM 2 G: 2 INJECTION, SOLUTION INTRAVENOUS at 20:55

## 2020-08-13 RX ADMIN — LIDOCAINE HYDROCHLORIDE 0.2 ML: 10 INJECTION, SOLUTION EPIDURAL; INFILTRATION; INTRACAUDAL; PERINEURAL at 11:36

## 2020-08-13 RX ADMIN — PROPOFOL 150 MG: 10 INJECTION, EMULSION INTRAVENOUS at 13:18

## 2020-08-13 RX ADMIN — ONDANSETRON 4 MG: 2 INJECTION INTRAMUSCULAR; INTRAVENOUS at 14:42

## 2020-08-13 RX ADMIN — DEXAMETHASONE SODIUM PHOSPHATE 6 MG: 4 INJECTION, SOLUTION INTRAMUSCULAR; INTRAVENOUS at 13:33

## 2020-08-13 RX ADMIN — TAMSULOSIN HYDROCHLORIDE 0.4 MG: 0.4 CAPSULE ORAL at 17:03

## 2020-08-13 RX ADMIN — INSULIN LISPRO 4 UNITS: 100 INJECTION, SOLUTION INTRAVENOUS; SUBCUTANEOUS at 17:03

## 2020-08-13 RX ADMIN — LIDOCAINE HYDROCHLORIDE 50 MG: 10 INJECTION, SOLUTION INFILTRATION; PERINEURAL at 13:18

## 2020-08-13 RX ADMIN — PROPOFOL 25 MCG/KG/MIN: 10 INJECTION, EMULSION INTRAVENOUS at 13:29

## 2020-08-13 RX ADMIN — TRANEXAMIC ACID 1000 MG: 100 INJECTION, SOLUTION INTRAVENOUS at 13:33

## 2020-08-13 RX ADMIN — BUPIVACAINE HYDROCHLORIDE 15 ML: 2.5 INJECTION, SOLUTION EPIDURAL; INFILTRATION; INTRACAUDAL; PERINEURAL at 12:05

## 2020-08-13 RX ADMIN — DEXAMETHASONE SODIUM PHOSPHATE 4 MG: 10 INJECTION INTRAMUSCULAR; INTRAVENOUS at 13:20

## 2020-08-13 RX ADMIN — BUDESONIDE AND FORMOTEROL FUMARATE DIHYDRATE 2 PUFF: 80; 4.5 AEROSOL RESPIRATORY (INHALATION) at 20:51

## 2020-08-13 RX ADMIN — SODIUM CHLORIDE, POTASSIUM CHLORIDE, SODIUM LACTATE AND CALCIUM CHLORIDE 9 ML/HR: 600; 310; 30; 20 INJECTION, SOLUTION INTRAVENOUS at 11:35

## 2020-08-13 NOTE — ANESTHESIA PREPROCEDURE EVALUATION
Anesthesia Evaluation     Patient summary reviewed and Nursing notes reviewed   no history of anesthetic complications:  NPO Solid Status: > 8 hours  NPO Liquid Status: > 2 hours           Airway   Mallampati: II  TM distance: >3 FB  Neck ROM: full  Dental - normal exam     Pulmonary - normal exam   (+) asthma,  Cardiovascular - normal exam    ECG reviewed    (+) hypertension, hyperlipidemia,   (-) angina, ALCARAZ    ROS comment: 2018  Cardiac Catheterization/Vascular Study CARDIAC CATH HEMO DATA - SCAN  Impression:   · Mild nonobstructive coronary atherosclerosis visualized in the proximal   to mid sections of the major epicardial vessels.  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    RECOMMENDATIONS:  · Continue medical therapy        Echo 2018  · Left ventricular systolic function is normal. Estimated EF = 55%.  · Left ventricular wall thickness is consistent with mild concentric hypertrophy.  · Left ventricular diastolic dysfunction (grade I a) consistent with impaired relaxation.  · Right ventricular cavity is mildly dilated.       Neuro/Psych- negative ROS  GI/Hepatic/Renal/Endo    (+)   diabetes mellitus well controlled,   (-) GERD, hepatitis, liver disease, no renal disease, no thyroid disorder    Musculoskeletal     Abdominal    Substance History      OB/GYN          Other   arthritis,    history of cancer (prostate ca)                  Anesthesia Plan    ASA 3     general with block     intravenous induction     Anesthetic plan, all risks, benefits, and alternatives have been provided, discussed and informed consent has been obtained with: patient.    Plan discussed with CRNA.

## 2020-08-13 NOTE — ANESTHESIA PROCEDURE NOTES
Peripheral Block      Patient reassessed immediately prior to procedure    Patient location during procedure: pre-op  Start time: 8/13/2020 12:00 PM  Reason for block: procedure for pain, at surgeon's request and post-op pain management  Performed by  Anesthesiologist: Thi Crowder MD  Assisted by: Nara Odonnell RN  Preanesthetic Checklist  Completed: patient identified, site marked, surgical consent, pre-op evaluation, timeout performed, IV checked, risks and benefits discussed and monitors and equipment checked  Prep:  Pt Position: left lateral decubitus  Sterile barriers:cap, gloves, mask and sterile barriers  Prep: ChloraPrep  Patient monitoring: blood pressure monitoring, continuous pulse oximetry and EKG  Procedure  Sedation:no  Performed under: local infiltration  Guidance:ultrasound guided  ULTRASOUND INTERPRETATION. Using ultrasound guidance a gauge needle was placed in close proximity to the brachial plexus nerve, at which point, under ultrasound guidance anesthetic was injected in the area of the nerve and spread of the anesthesia was seen on ultrasound in close proximity thereto.  There were no abnormalities seen on ultrasound; a digital image was taken; and the patient tolerated the procedure with no complications. Images:still images not obtained    Laterality:right  Block Type:interscalene  Injection Technique:catheter  Needle Type:Tuohy and echogenic  Needle Gauge:18 G  Resistance on Injection: none  Catheter Size:20 G (20g)  Cath Depth at skin: 7 cm    Medications Used: bupivacaine PF (MARCAINE) 0.25 % injection, 15 mL  Med admintered at 8/13/2020 12:05 PM      Post Assessment  Injection Assessment: negative aspiration for heme, no paresthesia on injection and incremental injection  Patient Tolerance:comfortable throughout block  Complications:no  Additional Notes  Procedure:                 The pt was placed in semifowlers position with a slight tilt of the thorax contralateral to the  insertion site.  The Insertion Site was prepped and draped in sterile fashion.  The pt was anesthetized with  IV Sedation( see meds) and  Skin and cutaneous tissue was infiltrated and anesthetized with 1% Lidocaine 3 mls via a 25g needle.  Utilizing ultrasound guidance, a BBraun 2 inch 18 g Contiplex echogenic touhy needle was advanced in-plane.  Hydro dissection of tissue was achieved with Normal saline. Major vessels(carotid and Internal Jugular) where visualized as the brachial plexus was approached at the approximate level of C-7/ T-1.  Cervical 5 and Branches of Cervical 6 nerve roots where visualized and the needle tip was placed posterior at the level of C-6 roots.  LA spread was visualized and injection was made incrementally every 5 mls with aspiration. Injection pressure was normal or little, there was no intraneural injection, no vascular injection.      The BBraun 20 g wire stylet  catheter was then placed under US guidance on the posterior aspect of the Brachial Plexus.  Location of catheter was confirmed with NS injection visualized with US . The tuohy was then removed and the skin was sealed with Skin AFix at catheter insertion site.  Skin was prepped with mastisol and the labeled catheter  was secured with steristrips and a CHG tegaderm. Thank You.

## 2020-08-13 NOTE — ANESTHESIA PROCEDURE NOTES
Right PECS 1&2      Patient reassessed immediately prior to procedure    Patient location during procedure: OR  Reason for block: at surgeon's request and post-op pain management  Performed by  CRNA: Alyce Alamo CRNA  Preanesthetic Checklist  Completed: patient identified, site marked, surgical consent, pre-op evaluation, timeout performed, IV checked, risks and benefits discussed and monitors and equipment checked  Prep:  Pt Position: supine  Sterile barriers:cap, gloves, gown and mask  Prep: ChloraPrep  Patient monitoring: blood pressure monitoring, continuous pulse oximetry and EKG  Procedure  Performed under: general  Guidance:ultrasound guided and landmark technique  Images:still images obtained, printed/placed on chart    Laterality:right  Block Type:PECS I and PECS II  Injection Technique:single-shot  Needle Type:short-bevel  Needle Gauge:20 G  Resistance on Injection: none    Medications Used: dexamethasone sodium phosphate injection, 4 mg  bupivacaine PF (MARCAINE) 0.25 % injection, 30 mL  Med admintered at 8/13/2020 1:20 PM      Medications  Preservative Free Saline:10ml  Comment:Right side only      Post Assessment  Injection Assessment: negative aspiration for heme and incremental injection  Patient Tolerance:comfortable throughout block  Complications:no  Additional Notes  The pt. Was placed in the Supine Position and GA was induced     The insertion site was prepped with CHG and Ultrasound guidance with In-Plane techniquewas  a 4inch BBraun 360 degree echogenic needle was visualized.  Normal Saline PSF was  utilized for hydrodissection of tissue. PECS 1 Block- Pectoralis Major and Minor where identified and LA was injected between PMM and PmM at the level of the 3rd Rib(10ml),  PECS 2-  Pectoralis Minor and Serratus muscle where identified and the needle was advanced laterally in-plane with the 4th rib as a backstop, pleura was monitored.  LA was injected between SA and PmM at the level of 4th rib(  20ml).  LA injection spread was visualized, injection was incremental 1-5ml, normal or low injection pressure, no intravascular injection, no pneumothorax appreciated.  Thank You.

## 2020-08-13 NOTE — OP NOTE
Operative Report Reverse Total Shoulder Arthroplasty    DATE OF OPERATION: 08/13/20    PREOPERATIVE DIAGNOSIS: Right shoulder rotator cuff arthropathy      POSTOPERATIVE DIAGNOSES:  1. right shoulder rotator cuff arthropathy        PROCEDURES PERFORMED:  1. right reverse total shoulder arthroplasty.          SURGEON: Steven Raphael MD    ASSISTANTS:  1. Elham Randolph PA-C  ** Please note the physician assistant was medically necessary to assist with positioning retraction, arm positioning, care of soft tissues and closure          ANESTHESIA: General plus block.      ESTIMATED BLOOD LOSS:150mL.      COMPLICATIONS: None.      DISPOSITION: Recovery room in stable condition.      IMPLANTS:  Arthrex reverse total shoulder system  Humeral Stem: size 12, short  Humeral Tray: 39, offset, 135 deg neck shaft angle  Polyethylene Liner: 39+3, std  Baseplate: 24 +4, MGS, 20mm central post screw  Glenosphere: 39+4     INDICATIONS: This is a 80-year-old male with right shoulder pain and limited function and motion secondary to rotator cuff arthropathy. They have failed conservative treatment and after a discussion of risks, benefits, and alternatives, wished to proceed with shoulder arthroplasty.    DESCRIPTION OF PROCEDURE: On the day of surgery, the patient identified the right shoulder as the correct operative extremity. This was initialed by the surgeon with the patients's acknowledgment. The patient underwent placement of an interscalene block and was taken to the operating room and placed in the supine position. Upon induction of adequate anesthesia, the patient was brought up to the beach chair position and the shoulder and upper extremity were prepped and draped in the usual sterile fashion. Timeout confirmed the correct patient and operative extremity as well as that antibiotics were on board. A standard deltopectoral approach to the shoulder was carried out. It was carried sharply through the skin and subcutaneous  tissue. Medial and lateral flaps were developed over the deltopectoral fascia. The cephalic vein was identified and mobilized laterally with the deltoid. The subdeltoid and subpectoral spaces were mobilized and a blunt retractor was placed deep to this. The clavipectoral fascia was opened on the lateral edge of the conjoined tendon and the retractor was moved deep to this. The leading edge of the pectoralis was released exposing the long head of the biceps. This was tenosynovitic and therefore tenotomized. The 3 sisters were identified and coagulated. A subscapularis tenotomy was performed and rotator interval was released to the glenoid exposing the humeral head. The inferior capsule was released directly off the humerus to allow greater than 90° of external rotation. The anatomic neck was exposed and the humeral head osteotomy was performed in approximately 20° of retroversion. The remainder of the osteophytes were removed. The canal was then entered, reamed, and broached. The final stem impacted in in approximately 20° of retroversion. A head protector was placed. The humerus was subluxed posteriorly. The glenoid exposed. Circumferential labral excision and capsular release were performed. A  mobilization of the subscapularis was carried out as well.  A centering hole was drilled. The glenoid was gently reamed and then the  central hole for the baseplate was drilled  glenoid baseplate inserted.  Screws were then placed through the baseplate  The glenosphere was then inserted and locked into place with a set screw.  The humerus was carefully subluxed back anteriorly. A liner tray and polyethylene were placed and trialing was carried out. The appropriate final sizes were chosen and locked into place.  The shoulder was then reduced.  This allowed nearly full passive range of motion with no instability. The joint was copiously irrigated with orthopedic irrigation after the final implants were assembled and locked  into place.      vancomycin powder was placed in the wound      The deltopectoral interval was approximated with 0 Vicryl, the subcutaneous tissue with 2-0 Vicryl, and the skin with nylon. A sterile dressing was placed. Anesthesia was reversed and the patient was taken to the recovery room in stable condition. All instrument, needle, and sponge counts were correct.      Steven Raphael MD, MS

## 2020-08-13 NOTE — PLAN OF CARE
Problem: Patient Care Overview  Goal: Plan of Care Review  Outcome: Ongoing (interventions implemented as appropriate)  Flowsheets (Taken 8/13/2020 1741)  Progress: improving  Plan of Care Reviewed With: patient  Outcome Summary: Patient arrived to the unit at aprox 1640. VSS on 2 l NC will titrate as tolerated. Sling and Aquacel CDI. Arrow block infusing at 6ml/hr rate unchanged. has yelitza able to ambulate with assist of 1. Has been able to void. Pt currently denies pain. Temp low Bear hugger placed will recheck temp. Possible d/c tomorrow will continue to monitor.      Problem: Shoulder Arthroplasty (Adult)  Goal: Signs and Symptoms of Listed Potential Problems Will be Absent, Minimized or Managed (Shoulder Arthroplasty)  Outcome: Ongoing (interventions implemented as appropriate)  Flowsheets (Taken 8/13/2020 1741)  Problems Assessed (Shoulder Arthro): all  Problems Present (Shoulder Arthro): functional deficit; peripheral nerve impairment  Goal: Anesthesia/Sedation Recovery  Outcome: Ongoing (interventions implemented as appropriate)  Flowsheets (Taken 8/13/2020 1741)  Anesthesia/Sedation Recovery: progressing toward baseline

## 2020-08-13 NOTE — ANESTHESIA PROCEDURE NOTES
Airway  Urgency: elective    Date/Time: 8/13/2020 1:19 PM  Airway not difficult    General Information and Staff    Patient location during procedure: OR  CRNA: Alyce Alamo CRNA    Indications and Patient Condition  Indications for airway management: airway protection    Preoxygenated: yes  MILS not maintained throughout  Mask difficulty assessment: 1 - vent by mask    Final Airway Details  Final airway type: endotracheal airway      Successful airway: ETT  Cuffed: yes   Successful intubation technique: direct laryngoscopy  Facilitating devices/methods: intubating stylet  Endotracheal tube insertion site: oral  Blade: Hartman  Blade size: 2  ETT size (mm): 7.0  Cormack-Lehane Classification: grade I - full view of glottis  Placement verified by: chest auscultation and capnometry   Measured from: lips  ETT/EBT  to lips (cm): 20  Number of attempts at approach: 1  Assessment: lips, teeth, and gum same as pre-op and atraumatic intubation    Additional Comments  Negative epigastric sounds, Breath sound equal bilaterally with symmetric chest rise and fall. Dentures removed, atraumatic

## 2020-08-13 NOTE — H&P
Pre-Op H&P  Scotty Gore  [id number]  1940    Chief complaint: Right shoulder pain, decreased range of motion    HPI:    Patient is a 80 y.o.male who presents with right shoulder pain and decreased range of motion.  Able to do minimal activities with limited mobility of right arm including food prep.  Pt noted to have elevated A1C of 9.90 on 8/4/20 in PAT.  Pt reports that he was out of his oral DM medications for 3 weeks due to his PCP being ill.  He has restarted them.  Poor blood sugar control has been discussed with pt in office including increased risk of infection and poor wound healing post operatively.  He understands and agrees to proceed.   Here today to undergo right reverse total shoulder arthroplasty.    Review of Systems:  General ROS: negative for chills, fever or skin lesions;  No changes since last office visit.  Neg for recent sick exposure  Cardiovascular ROS: no chest pain or dyspnea on exertion  Respiratory ROS: no cough, shortness of breath, or wheezing.  History of Asthma, well controlled    Allergies: No Known Allergies    Home Meds:    No current facility-administered medications on file prior to encounter.      Current Outpatient Medications on File Prior to Encounter   Medication Sig Dispense Refill   • B Complex-C-E-Zn (B COMPLEX-C-E-ZINC) tablet Take 1 tablet by mouth Daily.     • BREO ELLIPTA 100-25 MCG/INH aerosol powder  Inhale 1 puff As Needed.     • glyburide (DIAbeta) 5 MG tablet Take 5 mg by mouth Daily With Breakfast.     • lisinopril (PRINIVIL,ZESTRIL) 20 MG tablet Take 20 mg by mouth Daily.     • meloxicam (MOBIC) 7.5 MG tablet Take 7.5 mg by mouth Daily.     • metFORMIN (GLUCOPHAGE) 500 MG tablet Take 1,000 mg by mouth 2 (Two) Times a Day With Meals.     • montelukast (SINGULAIR) 10 MG tablet Take 10 mg by mouth Every Night.     • oxybutynin XL (DITROPAN-XL) 10 MG 24 hr tablet Take 10 mg by mouth Daily.     • tamsulosin (FLOMAX) 0.4 MG capsule 24 hr capsule Take 1  "capsule by mouth Daily.     • atorvastatin (LIPITOR) 20 MG tablet Take 10 mg by mouth Daily.     • VENTOLIN  (90 Base) MCG/ACT inhaler Inhale 1 puff As Needed. PT. STATED THAT HE HAS NOT HAD TO USE THIS YET.         PMH:   Past Medical History:   Diagnosis Date   • Arthritis    • Asthma    • Cancer (CMS/HCC)     PROSTATE CA   • Diabetes mellitus (CMS/HCC)     DX. 2006, FSBS 1 X 3-4 DAYS   • High cholesterol    • History of kidney stones    • History of radiation therapy     for prostate cancer    • Hypertension    • SOB (shortness of breath)    • Wears dentures      PSH:    Past Surgical History:   Procedure Laterality Date   • CARDIAC CATHETERIZATION N/A 1/30/2018    Procedure: Left Heart Cath;  Surgeon: Bradley Sandhu III, MD;  Location: [facility] INVASIVE LOCATION;  Service:    • COLONOSCOPY     • HERNIA REPAIR     • KIDNEY STONE SURGERY     • REPLACEMENT TOTAL KNEE BILATERAL     • SHOULDER ARTHROSCOPY Left      Social History:   Tobacco:   Social History     Tobacco Use   Smoking Status Never Smoker   Smokeless Tobacco Never Used      Alcohol:     Social History     Substance and Sexual Activity   Alcohol Use No       Vitals:           /95 (BP Location: Right arm, Patient Position: Lying)   Pulse (!) 47   Temp 97.6 °F (36.4 °C) (Temporal)   Resp 16   Ht 177.8 cm (70\")   Wt 100 kg (221 lb)   SpO2 94%   BMI 31.71 kg/m²     Physical Exam:  General Appearance:    Alert, cooperative, no distress, appears stated age   Head:    Normocephalic, without obvious abnormality, atraumatic   Lungs:     Clear to auscultation bilaterally, respirations unlabored    Heart:   Regular rate and rhythm, S1 and S2 normal, no murmur, rub    or gallop    Abdomen:    Soft, nontender.  +bowel sounds   Breast Exam:    deferred   Genitalia:    deferred   Extremities:   Extremities normal, atraumatic, no cyanosis or edema   Skin:   Skin color, texture, turgor normal, no rashes or lesions   Neurologic:   Grossly intact "   Results Review  LABS:  Lab Results   Component Value Date    WBC 5.19 08/04/2020    HGB 13.0 08/04/2020    HCT 40.9 08/04/2020    MCV 90.5 08/04/2020     08/04/2020    NEUTROABS 3.19 08/04/2020    GLUCOSE 156 (H) 08/04/2020    BUN 17 08/04/2020    CREATININE 1.28 (H) 08/04/2020    EGFRIFNONA 54 (L) 08/04/2020    EGFRIFAFRI 66 08/04/2020     08/04/2020    K 4.6 08/04/2020     08/04/2020    CO2 25.0 08/04/2020    CALCIUM 9.6 08/04/2020    ALBUMIN 4.10 08/04/2020    AST 30 08/04/2020    ALT 24 08/04/2020    BILITOT 0.3 08/04/2020    PTT 28.9 08/04/2020    INR 1.11 08/04/2020       RADIOLOGY:  Imaging Results (Last 72 Hours)     ** No results found for the last 72 hours. **          I reviewed the patient's new clinical results.  Preop     Cancer Staging (if applicable)  Cancer Patient: __ yes _x_no __unknown; If yes, clinical stage T:__ N:__M:__, stage group or __N/A    Impression:  1.  Right shoulder osteoarthritis with decreased mobility or RUE  2.  DM2, uncontrolled.  Discussed post operative risks of infection, poor wound healing and need for tight post op glucose control.  Pt verbalizes understanding.  Hospitalist to see post op     Plan: Right reverse total shoulder arthroplasty    Janet Grant, APRN   8/13/2020   12:00

## 2020-08-13 NOTE — ANESTHESIA POSTPROCEDURE EVALUATION
Patient: Scotty Gore    Procedure Summary     Date:  08/13/20 Room / Location:   [facility] / [facility]    Anesthesia Start:  1312 Anesthesia Stop:      Procedure:  REVERSE TOTAL SHOULDER  ARTHROPLASTY RIGHT (Right Shoulder) Diagnosis:       Rotator cuff arthropathy, right      (massive irreparable rotator cuff tear)    Surgeon:  Steven Raphael MD Provider:  Raji Valentino MD    Anesthesia Type:  general with block ASA Status:  3          Anesthesia Type: general with block    Vitals  Vitals Value Taken Time   /103 8/13/2020  3:24 PM   Temp     Pulse 59 8/13/2020  3:27 PM   Resp     SpO2 91 % 8/13/2020  3:27 PM   Vitals shown include unvalidated device data.        Post Anesthesia Care and Evaluation    Patient location during evaluation: PACU  Patient participation: complete - patient participated  Level of consciousness: awake and alert  Pain score: 0  Pain management: adequate  Airway patency: patent  Anesthetic complications: No anesthetic complications  PONV Status: none  Cardiovascular status: hemodynamically stable and acceptable  Respiratory status: nonlabored ventilation, acceptable and nasal cannula  Hydration status: acceptable

## 2020-08-13 NOTE — H&P
Patient Name: Scotty Gore  MRN: [id number]  : 1940  DOS: 2020    Attending: Steven Raphael MD    Primary Care Provider: KARYN Velez MD      Chief complaint: Right shoulder pain    Subjective   Patient is a pleasant 80 y.o. male presented for scheduled surgery by Dr. Raphael.  He anticipates a right reverse total shoulder arthroplasty today.  His shoulder has been painful with limited range of motion for about 3 weeks.  He injured it in a fall when he slipped out of the truck.  He denies use of assistive device for ambulation.    When seen preop he is doing well.  His pain is well controlled.  He denies nausea, shortness of breath or chest pain.  No history of DVT or PE.    He is diabetic and his preop A1c was 9.9.  He reports he has been out of his metformin for about 3 months due to his PCP being ill.    (Above is noted, agree.  Mr. Gore underwent right reverse total shoulder arthroplasty by Dr. Raphael under general anesthesia and a block, he tolerated surgery well, was admitted for further management.  In his room postoperatively, he is doing very well, his pain is under good control.  He has no difficulty breathing no difficulty swallowing.  He ambulated to the bathroom, he was able to void.  His temperature was slightly low and a bear hugger was placed.)wy    Allergies:  No Known Allergies    Meds:  Medications Prior to Admission   Medication Sig Dispense Refill Last Dose   • B Complex-C-E-Zn (B COMPLEX-C-E-ZINC) tablet Take 1 tablet by mouth Daily.   8/10/2020   • BREO ELLIPTA 100-25 MCG/INH aerosol powder  Inhale 1 puff As Needed.   8/10/2020 at Unknown time   • glyburide (DIAbeta) 5 MG tablet Take 5 mg by mouth Daily With Breakfast.   8/10/2020   • lisinopril (PRINIVIL,ZESTRIL) 20 MG tablet Take 20 mg by mouth Daily.   8/10/2020   • meloxicam (MOBIC) 7.5 MG tablet Take 7.5 mg by mouth Daily.   8/10/2020   • metFORMIN (GLUCOPHAGE) 500 MG tablet Take 1,000 mg by mouth 2 (Two)  Times a Day With Meals.   8/12/2020 at 0900   • montelukast (SINGULAIR) 10 MG tablet Take 10 mg by mouth Every Night.   8/10/2020   • oxybutynin XL (DITROPAN-XL) 10 MG 24 hr tablet Take 10 mg by mouth Daily.   8/10/2020   • tamsulosin (FLOMAX) 0.4 MG capsule 24 hr capsule Take 1 capsule by mouth Daily.   8/10/2020   • atorvastatin (LIPITOR) 20 MG tablet Take 10 mg by mouth Daily.   8/10/2020   • benzoyl peroxide (benzoyl peroxide) 5 % external liquid Use as directed per physician 148 g 0    • ondansetron (ZOFRAN) 4 MG tablet Take 1 tablet by mouth Every 8 (Eight) Hours As Needed for post operative nausea 30 tablet 0 Unknown at Unknown time   • VENTOLIN  (90 Base) MCG/ACT inhaler Inhale 1 puff As Needed. PT. STATED THAT HE HAS NOT HAD TO USE THIS YET.   Unknown at Unknown time         History:   Past Medical History:   Diagnosis Date   • Arthritis    • Asthma    • Cancer (CMS/HCC)     PROSTATE CA   • Diabetes mellitus (CMS/HCC)     DX. 2006, FSBS 1 X 3-4 DAYS   • High cholesterol    • History of kidney stones    • History of radiation therapy     for prostate cancer    • Hypertension    • SOB (shortness of breath)    • Wears dentures      Past Surgical History:   Procedure Laterality Date   • CARDIAC CATHETERIZATION N/A 1/30/2018    Procedure: Left Heart Cath;  Surgeon: Bradley Sandhu III, MD;  Location: [facility] INVASIVE LOCATION;  Service:    • COLONOSCOPY     • HERNIA REPAIR     • KIDNEY STONE SURGERY     • REPLACEMENT TOTAL KNEE BILATERAL     • SHOULDER ARTHROSCOPY Left      History reviewed. No pertinent family history.  Social History     Tobacco Use   • Smoking status: Never Smoker   • Smokeless tobacco: Never Used   Substance Use Topics   • Alcohol use: No   • Drug use: No   He is  and has 5 children.  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    Review of Systems  All systems were reviewed and negative except for:  Respiratory: positive for  shortness of air    Vital Signs  /95 (BP Location: Right  "arm, Patient Position: Lying)   Pulse (!) 47   Temp 97.6 °F (36.4 °C) (Temporal)   Resp 16   Ht 177.8 cm (70\")   Wt 100 kg (221 lb)   SpO2 94%   BMI 31.71 kg/m²     Physical Exam:    General Appearance:    Alert, cooperative, in no acute distress   Head:    Normocephalic, without obvious abnormality, atraumatic   Eyes:            Lids and lashes normal, conjunctivae and sclerae normal, no   icterus, no pallor, corneas clear,    Ears:    Ears appear intact with no abnormalities noted   Throat:   No oral lesions, no thrush, oral mucosa moist   Neck:   No adenopathy, supple, trachea midline, no thyromegaly    Lungs:     Clear to auscultation,respirations regular, even and unlabored    Heart:    Regular rhythm and normal rate, normal S1 and S2, no murmur, no gallop   Abdomen:     Normal bowel sounds, no masses, no organomegaly, soft non-tender, non-distended, no guarding, no rebound  tenderness   Genitalia:    Deferred   Extremities:  Right interscalene block present good movement and cap refill right digits   Pulses:   Pulses palpable and equal bilaterally   Skin:   No bleeding, bruising or rash   Neurologic:   Cranial nerves 2 - 12 grossly intact.         I reviewed the patient's new clinical results.       Lab Results   Component Value Date    HGBA1C 9.90 (H) 08/04/2020     Results for DALTON RAYA (MRN [id number]) as of 8/13/2020 13:39   Ref. Range 8/4/2020 14:00   Glucose Latest Ref Range: 65 - 99 mg/dL 156 (H)   Sodium Latest Ref Range: 136 - 145 mmol/L 140   Potassium Latest Ref Range: 3.5 - 5.2 mmol/L 4.6   CO2 Latest Ref Range: 22.0 - 29.0 mmol/L 25.0   Chloride Latest Ref Range: 98 - 107 mmol/L 106   Anion Gap Latest Ref Range: 5.0 - 15.0 mmol/L 9.0   Creatinine Latest Ref Range: 0.76 - 1.27 mg/dL 1.28 (H)   BUN Latest Ref Range: 8 - 23 mg/dL 17   BUN/Creatinine Ratio Latest Ref Range: 7.0 - 25.0  13.3   Calcium Latest Ref Range: 8.6 - 10.5 mg/dL 9.6   eGFR Non  Am Latest Ref Range: >60 " mL/min/1.73 54 (L)   eGFR  Am Latest Ref Range: >60 mL/min/1.73 66   Alkaline Phosphatase Latest Ref Range: 39 - 117 U/L 80   Total Protein Latest Ref Range: 6.0 - 8.5 g/dL 6.5   ALT (SGPT) Latest Ref Range: 1 - 41 U/L 24   AST (SGOT) Latest Ref Range: 1 - 40 U/L 30   Total Bilirubin Latest Ref Range: 0.0 - 1.2 mg/dL 0.3   Albumin Latest Ref Range: 3.50 - 5.20 g/dL 4.10   Globulin Latest Units: gm/dL 2.4   A/G Ratio Latest Units: g/dL 1.7   Hemoglobin A1C Latest Ref Range: 4.80 - 5.60 % 9.90 (H)   Protime Latest Ref Range: 11.5 - 14.0 Seconds 14.0   INR Latest Ref Range: 0.85 - 1.16  1.11   PTT Latest Ref Range: 24.0 - 37.0 seconds 28.9   WBC Latest Ref Range: 3.40 - 10.80 10*3/mm3 5.19   RBC Latest Ref Range: 4.14 - 5.80 10*6/mm3 4.52   Hemoglobin Latest Ref Range: 13.0 - 17.7 g/dL 13.0   Hematocrit Latest Ref Range: 37.5 - 51.0 % 40.9   RDW Latest Ref Range: 12.3 - 15.4 % 14.5   MCV Latest Ref Range: 79.0 - 97.0 fL 90.5   MCH Latest Ref Range: 26.6 - 33.0 pg 28.8   MCHC Latest Ref Range: 31.5 - 35.7 g/dL 31.8   MPV Latest Ref Range: 6.0 - 12.0 fL 11.3   Platelets Latest Ref Range: 140 - 450 10*3/mm3 167       Assessment and Plan:   Status post right total reverse shoulder arthroplasty    Rotator cuff arthropathy, right    Hypertension    Uncontrolled diabetes mellitus (CMS/HCC)    Hyperlipidemia    Renal insufficiency      Plan  1. PT/OT-ROBERT Forrest  2. Pain control-prns, interscalene block   3. IS-encourage  4. DVT proph- [facility]  5. Bowel regimen  6. Resume home medications per Dr. Raphael  7. Monitor post-op labs  8. DC planning for home    HTN, Hyperlipidemia  - Continue home statin  - Hold lisinopril  - Monitor BP   - Labetalol PRN for SBP>170    Uncontrolled DM(recently received 90-day supply of his diabetic medications after a gap of no meds)wy  - hgb A1c on 8/4/20 9.9  - hold OHA  - added levemir nightly  - DM educator consult  - Accu-Chek AC and HS with moderate dose SSI    RI  - BMP in AM  -  avoid nephrotoxic agents      ROMERO Kennedy  08/13/20  13:40     I have personally performed the evaluation on this patient. My history is consistent  with HPI obtained. My exam findings are listed above. I have personally reviewed and discussed the above formulated treatment plan with patient, wife, and VERITO JASSO.wy.

## 2020-08-14 VITALS
SYSTOLIC BLOOD PRESSURE: 148 MMHG | BODY MASS INDEX: 31.64 KG/M2 | TEMPERATURE: 98.1 F | WEIGHT: 221 LBS | HEART RATE: 96 BPM | RESPIRATION RATE: 18 BRPM | OXYGEN SATURATION: 93 % | DIASTOLIC BLOOD PRESSURE: 97 MMHG | HEIGHT: 70 IN

## 2020-08-14 PROBLEM — Z96.611 S/P REVERSE TOTAL SHOULDER ARTHROPLASTY, RIGHT: Status: ACTIVE | Noted: 2020-08-14

## 2020-08-14 PROBLEM — G89.18 ACUTE POSTOPERATIVE PAIN: Status: ACTIVE | Noted: 2020-08-14

## 2020-08-14 LAB
ANION GAP SERPL CALCULATED.3IONS-SCNC: 12 MMOL/L (ref 5–15)
BASOPHILS # BLD AUTO: 0.02 10*3/MM3 (ref 0–0.2)
BASOPHILS NFR BLD AUTO: 0.2 % (ref 0–1.5)
BUN SERPL-MCNC: 13 MG/DL (ref 8–23)
BUN/CREAT SERPL: 11.7 (ref 7–25)
CALCIUM SPEC-SCNC: 8.8 MG/DL (ref 8.6–10.5)
CHLORIDE SERPL-SCNC: 101 MMOL/L (ref 98–107)
CO2 SERPL-SCNC: 23 MMOL/L (ref 22–29)
CREAT SERPL-MCNC: 1.11 MG/DL (ref 0.76–1.27)
DEPRECATED RDW RBC AUTO: 46.8 FL (ref 37–54)
EOSINOPHIL # BLD AUTO: 0.11 10*3/MM3 (ref 0–0.4)
EOSINOPHIL NFR BLD AUTO: 1.2 % (ref 0.3–6.2)
ERYTHROCYTE [DISTWIDTH] IN BLOOD BY AUTOMATED COUNT: 14.6 % (ref 12.3–15.4)
GFR SERPL CREATININE-BSD FRML MDRD: 64 ML/MIN/1.73
GLUCOSE BLDC GLUCOMTR-MCNC: 291 MG/DL (ref 70–130)
GLUCOSE BLDC GLUCOMTR-MCNC: 391 MG/DL (ref 70–130)
GLUCOSE SERPL-MCNC: 283 MG/DL (ref 65–99)
HCT VFR BLD AUTO: 41.3 % (ref 37.5–51)
HGB BLD-MCNC: 13.2 G/DL (ref 13–17.7)
IMM GRANULOCYTES # BLD AUTO: 0.04 10*3/MM3 (ref 0–0.05)
IMM GRANULOCYTES NFR BLD AUTO: 0.5 % (ref 0–0.5)
LYMPHOCYTES # BLD AUTO: 0.74 10*3/MM3 (ref 0.7–3.1)
LYMPHOCYTES NFR BLD AUTO: 8.3 % (ref 19.6–45.3)
MCH RBC QN AUTO: 28.1 PG (ref 26.6–33)
MCHC RBC AUTO-ENTMCNC: 32 G/DL (ref 31.5–35.7)
MCV RBC AUTO: 87.9 FL (ref 79–97)
MONOCYTES # BLD AUTO: 0.67 10*3/MM3 (ref 0.1–0.9)
MONOCYTES NFR BLD AUTO: 7.6 % (ref 5–12)
NEUTROPHILS NFR BLD AUTO: 7.29 10*3/MM3 (ref 1.7–7)
NEUTROPHILS NFR BLD AUTO: 82.2 % (ref 42.7–76)
NRBC BLD AUTO-RTO: 0 /100 WBC (ref 0–0.2)
PLAT MORPH BLD: NORMAL
PLATELET # BLD AUTO: 189 10*3/MM3 (ref 140–450)
PMV BLD AUTO: 11 FL (ref 6–12)
POTASSIUM SERPL-SCNC: 4.2 MMOL/L (ref 3.5–5.2)
RBC # BLD AUTO: 4.7 10*6/MM3 (ref 4.14–5.8)
RBC MORPH BLD: NORMAL
SODIUM SERPL-SCNC: 136 MMOL/L (ref 136–145)
WBC # BLD AUTO: 8.87 10*3/MM3 (ref 3.4–10.8)
WBC MORPH BLD: NORMAL

## 2020-08-14 PROCEDURE — 63710000001 INSULIN LISPRO (HUMAN) PER 5 UNITS: Performed by: NURSE PRACTITIONER

## 2020-08-14 PROCEDURE — 97110 THERAPEUTIC EXERCISES: CPT

## 2020-08-14 PROCEDURE — 85025 COMPLETE CBC W/AUTO DIFF WBC: CPT | Performed by: ORTHOPAEDIC SURGERY

## 2020-08-14 PROCEDURE — 80048 BASIC METABOLIC PNL TOTAL CA: CPT | Performed by: NURSE PRACTITIONER

## 2020-08-14 PROCEDURE — 97165 OT EVAL LOW COMPLEX 30 MIN: CPT

## 2020-08-14 PROCEDURE — 63710000001 INSULIN DETEMIR PER 5 UNITS: Performed by: NURSE PRACTITIONER

## 2020-08-14 PROCEDURE — 85007 BL SMEAR W/DIFF WBC COUNT: CPT | Performed by: ORTHOPAEDIC SURGERY

## 2020-08-14 PROCEDURE — 94799 UNLISTED PULMONARY SVC/PX: CPT

## 2020-08-14 PROCEDURE — 97535 SELF CARE MNGMENT TRAINING: CPT

## 2020-08-14 PROCEDURE — 25010000003 CEFAZOLIN IN DEXTROSE 2-4 GM/100ML-% SOLUTION: Performed by: ORTHOPAEDIC SURGERY

## 2020-08-14 PROCEDURE — 82962 GLUCOSE BLOOD TEST: CPT

## 2020-08-14 RX ORDER — DOCUSATE SODIUM 100 MG/1
100 CAPSULE, LIQUID FILLED ORAL 2 TIMES DAILY
Qty: 60 CAPSULE | Refills: 0 | Status: SHIPPED | OUTPATIENT
Start: 2020-08-14

## 2020-08-14 RX ORDER — OXYCODONE HYDROCHLORIDE 5 MG/1
5 TABLET ORAL EVERY 4 HOURS PRN
Qty: 40 TABLET | Refills: 0 | Status: SHIPPED | OUTPATIENT
Start: 2020-08-14 | End: 2020-08-23

## 2020-08-14 RX ADMIN — INSULIN LISPRO 6 UNITS: 100 INJECTION, SOLUTION INTRAVENOUS; SUBCUTANEOUS at 08:08

## 2020-08-14 RX ADMIN — BUDESONIDE AND FORMOTEROL FUMARATE DIHYDRATE 2 PUFF: 80; 4.5 AEROSOL RESPIRATORY (INHALATION) at 08:55

## 2020-08-14 RX ADMIN — INSULIN LISPRO 8 UNITS: 100 INJECTION, SOLUTION INTRAVENOUS; SUBCUTANEOUS at 12:12

## 2020-08-14 RX ADMIN — TAMSULOSIN HYDROCHLORIDE 0.4 MG: 0.4 CAPSULE ORAL at 08:08

## 2020-08-14 RX ADMIN — CEFAZOLIN SODIUM 2 G: 2 INJECTION, SOLUTION INTRAVENOUS at 03:05

## 2020-08-14 RX ADMIN — OXYCODONE 5 MG: 5 TABLET ORAL at 11:31

## 2020-08-14 RX ADMIN — INSULIN DETEMIR 10 UNITS: 100 INJECTION, SOLUTION SUBCUTANEOUS at 12:13

## 2020-08-14 RX ADMIN — OXYBUTYNIN CHLORIDE 10 MG: 10 TABLET, EXTENDED RELEASE ORAL at 08:08

## 2020-08-14 RX ADMIN — ATORVASTATIN CALCIUM 10 MG: 10 TABLET, FILM COATED ORAL at 08:08

## 2020-08-14 NOTE — CONSULTS
After obtaining permission, seen Mr. Gore for diabetes education.  We reviewed his current A1c of 9.9 and discussed its significance. Specifically discussed poor healing and risk for MI and CVA.  He states he has a working meter and blood sugars average around 200. He is taking Glyburide and Metformin at home. We discussed reporting trending high glucoses above 180 and reporting this A1c to his PCP for med adjustment.  We discussed avoiding sugary sodas and snacks. We discussed limiting carbs to 45 grams per meal and checking postprandial glucoses to problem solve meal choices.  We discussed increasing water and activity as tolerated.  We reviewed A1c and glucose goals per ADA recommendations and reviewed treatment for hyper and hypoglycemia. Mr. Zarate denies episodes of hypoglycemia.  We discussed seeing the physician and dentist regularly, daily feet checks, yearly eye exams, and BP and cholesterol monitoring. We reviewed taking medications as ordered.  Mr. Zarate states his A1c is usually between 7.5 and 8.5 but activity has been limited over the last few months. He was provided a Diabetes booklet, A1c sheet, and glucose goals sheet. He was given my card for future questions or concerns.  Thanks for the consult.

## 2020-08-14 NOTE — PLAN OF CARE
Problem: Patient Care Overview  Goal: Plan of Care Review  Outcome: Ongoing (interventions implemented as appropriate)  Flowsheets (Taken 8/14/2020 1009)  Outcome Summary: OT eval complete. Pt and wife educated on sling management as well as wear and care, shoulder precautions, axilla care, barbara dressing, and care of nerve catheter during ADLS. Wife with excellent teach back on sling management and provided modA for UBD via barbara dressing  technique. Pt completed AROM/AAROM x10 reps elbow/wrist/hand. Pt ambulated 350 feet with SBA and no AE. Pt with O2 saturation >88 throughout all mobility. Pt passed mobility screen and OT to d/c PT order at this time. Pt does need frequent verbal cues to maintain shoulder precautions and wife is aware. Recommend d/c home with assist.

## 2020-08-14 NOTE — DISCHARGE SUMMARY
Patient Name: Scotty Gore  MRN: [id number]  : 1940  DOS: 2020    Attending: No att. providers found    Primary Care Provider: KARYN Velez MD    Date of Admission:.2020 10:50 AM    Date of Discharge:  2020    Discharge Diagnosis:   S/P reverse total shoulder arthroplasty, right    Rotator cuff arthropathy, right    Hypertension    Uncontrolled diabetes mellitus (CMS/HCC)    Hyperlipidemia    Renal insufficiency    Acute postoperative pain      Hospital Course    At admit:    Patient is a pleasant 80 y.o. male presented for scheduled surgery by Dr. Raphael.  He underwent a right reverse total shoulder arthroplasty under GA. He tolerated surgery well and was admitted for further medical management.  His shoulder has been painful with limited range of motion for about 3 weeks.  He injured it in a fall when he slipped out of the truck.  He denies use of assistive device for ambulation.     When seen preop he is doing well.  His pain is well controlled.  He denies nausea, shortness of breath or chest pain.  No history of DVT or PE.     He is diabetic and his preop A1c was 9.9.  He reports he has been out of his metformin for about 3 months due to his PCP being ill.    After admit:    Patient was provided pain medications as needed for pain control, along with interscalene nerve block infusion of Ropivacaine.    Adjustments were made to pain medications to optimize postop pain management.   Risks and benefits of opiate medications discussed with patient.    The patient was seen by OT and was taught exercises for his right arm.  The patient used an IS for atelectasis prophylaxis and mechanicals for DVT prophylaxis.    Home medications were resumed as appropriate, and labs were monitored and remained fairly stable.   He did have elevated hemoglobin A1c on preop labs.  He was seen by diabetes educator while inpatient.  He is recently restarted his home oral hypoglycemic agents and will  "follow-up with his PCP.    With the progress he has made, he is ready for DC home today.      The patient will have an arrow pump ( instructed on it during this admit)  Discussed with patient regarding plan and he shows understanding and agreement.        Procedures Performed    DATE OF OPERATION: 08/13/20     PREOPERATIVE DIAGNOSIS: Right shoulder rotator cuff arthropathy      POSTOPERATIVE DIAGNOSES:  1. right shoulder rotator cuff arthropathy        PROCEDURES PERFORMED:  1. right reverse total shoulder arthroplasty.           SURGEON: Steven Raphael MD    Pertinent Test Results:    I reviewed the patient's new clinical results.   Results from last 7 days   Lab Units 08/14/20  0745   WBC 10*3/mm3 8.87   HEMOGLOBIN g/dL 13.2   HEMATOCRIT % 41.3   PLATELETS 10*3/mm3 189     Results from last 7 days   Lab Units 08/14/20  0745   SODIUM mmol/L 136   POTASSIUM mmol/L 4.2   CHLORIDE mmol/L 101   CO2 mmol/L 23.0   BUN mg/dL 13   CREATININE mg/dL 1.11   CALCIUM mg/dL 8.8   GLUCOSE mg/dL 283*     I reviewed the patient's new imaging including images and reports.      Occupational Therapy: OT eval complete. Pt and wife educated on sling management as well as wear and care, shoulder precautions, axilla care, barbara dressing, and care of nerve catheter during ADLS. Wife with excellent teach back on sling management and provided modA for UBD via barbara dressing  technique. Pt completed AROM/AAROM x10 reps elbow/wrist/hand. Pt ambulated 350 feet with SBA and no AE. Pt with O2 saturation >88 throughout all mobility. Pt passed mobility screen and OT to d/c PT order at this time. Pt does need frequent verbal cues to maintain shoulder precautions and wife is aware. Recommend d/c home with assist.       Discharge Assessment:    Vital Signs  Visit Vitals  /97 (BP Location: Left arm, Patient Position: Sitting)   Pulse 96   Temp 98.1 °F (36.7 °C) (Oral)   Resp 18   Ht 177.8 cm (70\")   Wt 100 kg (221 lb)   SpO2 91%   BMI 31.71 kg/m² "     Temp (24hrs), Av.3 °F (36.3 °C), Min:96.8 °F (36 °C), Max:98.2 °F (36.8 °C)      General Appearance:    Alert, cooperative, in no acute distress   Lungs:     Clear to auscultation,respirations regular, even and   unlabored    Heart:    Regular rhythm and normal rate, normal S1 and S2    Abdomen:     Normal bowel sounds, no masses, no organomegaly, soft   non-tender, non-distended, no guarding, no rebound tenderness   Extremities:   RUE in a sling, CDI Aquacel dressing. Interscalene nerve block cath present. Distal pulses, cap refill, movements of fingers, wrist, intact.   Pulses:   Pulses palpable and equal bilaterally   Skin:   No bleeding, bruising or rash   Neurologic:   Cranial nerves 2 - 12 grossly intact       Discharge Disposition: Home          Discharge Medications      New Medications      Instructions Start Date   docusate sodium 100 MG capsule  Commonly known as:  COLACE   100 mg, Oral, 2 Times Daily      oxyCODONE 5 MG immediate release tablet  Commonly known as:  ROXICODONE   5 mg, Oral, Every 4 Hours PRN      Ropivacine HCl-NaCl  Commonly known as:  NAROPIN   6 mL/hr (12 mg/hr), Peripheral Nerve, Continuous         Continue These Medications      Instructions Start Date   atorvastatin 20 MG tablet  Commonly known as:  LIPITOR   10 mg, Oral, Daily      b complex-C-E-zinc tablet   1 tablet, Oral, Daily      Breo Ellipta 100-25 MCG/INH inhaler  Generic drug:  Fluticasone Furoate-Vilanterol   1 puff, Inhalation, As Needed      glyburide 5 MG tablet  Commonly known as:  DIAbeta   5 mg, Oral, Daily With Breakfast      lisinopril 20 MG tablet  Commonly known as:  PRINIVIL,ZESTRIL   20 mg, Oral, Daily      metFORMIN 500 MG tablet  Commonly known as:  GLUCOPHAGE   1,000 mg, Oral, 2 Times Daily With Meals      montelukast 10 MG tablet  Commonly known as:  SINGULAIR   10 mg, Oral, Nightly      ondansetron 4 MG tablet  Commonly known as:  ZOFRAN   4 mg, Oral, Every 8 Hours PRN      oxybutynin XL 10 MG 24  hr tablet  Commonly known as:  DITROPAN-XL   10 mg, Oral, Daily      tamsulosin 0.4 MG capsule 24 hr capsule  Commonly known as:  FLOMAX   1 capsule, Oral, Daily      Ventolin  (90 Base) MCG/ACT inhaler  Generic drug:  albuterol sulfate HFA   1 puff, Inhalation, As Needed, PT. STATED THAT HE HAS NOT HAD TO USE THIS YET.         Stop These Medications    benzoyl peroxide 5 % external liquid  Generic drug:  benzoyl peroxide     meloxicam 7.5 MG tablet  Commonly known as:  MOBIC            Discharge Diet: Consistent carb diet      Activity at Discharge: ROBERT Forrest  Activity Instructions     NO SHOWERING UNTIL NERVE BLOCK IS OUT  LEAVE THE SHOULDER DRESSING ON UNTIL YOUR FOLLOW UP WITH DR CEDENO               Follow-up Appointments  Dr. Cedeno per his orders    Discharge took over 30 min    ROMERO Kennedy  08/14/20  14:42

## 2020-08-14 NOTE — PROGRESS NOTES
Discharge Planning Assessment  [facility]     Patient Name: Scotty Gore  MRN: [id number]  Today's Date: 8/14/2020    Admit Date: 8/13/2020    Discharge Needs Assessment     Row Name 08/14/20 1106       Living Environment    Lives With  spouse    Name(s) of Who Lives With Patient  Morena Gore    Current Living Arrangements  home/apartment/condo    Family Caregiver if Needed  spouse    Quality of Family Relationships  helpful;involved;supportive    Able to Return to Prior Arrangements  yes       Resource/Environmental Concerns    Resource/Environmental Concerns  none    Transportation Concerns  car, none       Transition Planning    Patient/Family Anticipates Transition to  home with family    Patient/Family Anticipated Services at Transition  none    Transportation Anticipated  family or friend will provide       Discharge Needs Assessment    Readmission Within the Last 30 Days  no previous admission in last 30 days    Concerns to be Addressed  no discharge needs identified    Equipment Currently Used at Home  none    Equipment Needed After Discharge  none        Discharge Plan     Row Name 08/14/20 1110       Plan    Plan  Home with wife's assistance    Patient/Family in Agreement with Plan  yes    Plan Comments  Met with Mr. Gore and his wife, Morena, at the bedside for discharge planning.  Mr. Gore lives with his wife in a one story home in [facility].  He states that he is ambulating independently and denies any DME needs.  The patient is still pending PT/OT evaluation.  Discussed home health needs and Mr. Gore declined home health.  He states that he will have sufficient assistance at home from his wife and she will be transporting him home when discharged.    PAUL osborne continue to follow.    Final Discharge Disposition Code  01 - home or self-care        Destination      Coordination has not been started for this encounter.      Durable Medical Equipment      Coordination has not been started  for this encounter.      Dialysis/Infusion      Coordination has not been started for this encounter.      Home Medical Care      Coordination has not been started for this encounter.      Therapy      Coordination has not been started for this encounter.      Community Resources      Coordination has not been started for this encounter.        Expected Discharge Date and Time     Expected Discharge Date Expected Discharge Time    Aug 15, 2020         Demographic Summary     Row Name 08/14/20 1105       General Information    Admission Type  inpatient    Arrived From  home    Reason for Consult  discharge planning    General Information Comments  PCP:  Adalberto Velez       Contact Information    Permission Granted to Share Info With      Contact Information Comments  Wife:  Morena, tesha [phone]        Functional Status     Row Name 08/14/20 1106       Functional Status    Usual Activity Tolerance  good    Current Activity Tolerance  -- Following for PT/OT notes.       Functional Status, IADL    Medications  independent    Meal Preparation  independent    Housekeeping  independent    Laundry  independent    Shopping  independent       Mental Status    General Appearance WDL  WDL        Psychosocial    No documentation.       Abuse/Neglect    No documentation.       Legal    No documentation.       Substance Abuse    No documentation.       Patient Forms    No documentation.           Tayler Fields RN

## 2020-08-14 NOTE — DISCHARGE INSTR - ACTIVITY
NO SHOWERING UNTIL NERVE BLOCK IS OUT  LEAVE THE SHOULDER DRESSING ON UNTIL YOUR FOLLOW UP WITH DR CEDENO

## 2020-08-14 NOTE — THERAPY DISCHARGE NOTE
Acute Care - Occupational Therapy Initial Eval/Discharge   [place]     Patient Name: Scotty Gore  : 1940  MRN: [id number]  Today's Date: 2020  Onset of Illness/Injury or Date of Surgery: 20  Date of Referral to OT: 20         Admit Date: 2020       ICD-10-CM ICD-9-CM   1. Rotator cuff arthropathy, right M12.811 716.81   2. Impaired mobility and ADLs Z74.09 V49.89    Z78.9      Patient Active Problem List   Diagnosis   • Dyspnea on exertion   • Abnormal stress echo   • Rotator cuff arthropathy, right   • Hypertension   • Uncontrolled diabetes mellitus (CMS/HCC)   • Hyperlipidemia   • Renal insufficiency   • S/P reverse total shoulder arthroplasty, right     Past Medical History:   Diagnosis Date   • Arthritis    • Asthma    • Cancer (CMS/HCC)     PROSTATE CA   • Diabetes mellitus (CMS/HCC)     DX. , FSBS 1 X 3-4 DAYS   • High cholesterol    • History of kidney stones    • History of radiation therapy     for prostate cancer    • Hypertension    • SOB (shortness of breath)    • Wears dentures      Past Surgical History:   Procedure Laterality Date   • CARDIAC CATHETERIZATION N/A 2018    Procedure: Left Heart Cath;  Surgeon: Bradley Sandhu III, MD;  Location: [facility] INVASIVE LOCATION;  Service:    • COLONOSCOPY     • HERNIA REPAIR     • KIDNEY STONE SURGERY     • REPLACEMENT TOTAL KNEE BILATERAL     • SHOULDER ARTHROSCOPY Left           OT ASSESSMENT FLOWSHEET (last 12 hours)      Occupational Therapy Evaluation     Row Name 20 1009                   OT Evaluation Time/Intention    Subjective Information  no complaints  -HK        Document Type  discharge evaluation/summary  -HK        Mode of Treatment  occupational therapy  -HK        Patient Effort  excellent  -HK        Symptoms Noted During/After Treatment  none  -HK           General Information    Patient Profile Reviewed?  yes  -HK        Onset of Illness/Injury or Date of Surgery  20  -HK         Prior Level of Function  independent:;community mobility;gait;transfer;bed mobility;all household mobility;min assist:;ADL's post fall  -HK        Equipment Currently Used at Home  none  -HK        Existing Precautions/Restrictions  fall;other (see comments);non-weight bearing;right;shoulder NWB R UE; R UE in sling; R interscalene nerve catheter   -HK        Risks Reviewed  patient:;spouse/S.O.:;LOB;nausea/vomiting;dizziness;increased discomfort;change in vital signs;increased drainage;lines disloged  -HK        Benefits Reviewed  patient:;spouse/S.O.:;improve function;increase independence;increase strength;increase balance;decrease pain;decrease risk of DVT;improve skin integrity;increase knowledge  -HK        Barriers to Rehab  none identified  -HK           Relationship/Environment    Primary Source of Support/Comfort  spouse  -HK        Lives With  spouse  -HK           Resource/Environmental Concerns    Current Living Arrangements  home/apartment/condo  -HK           Cognitive Assessment/Interventions    Additional Documentation  Cognitive Assessment/Intervention (Group)  -HK           Cognitive Assessment/Intervention- PT/OT    Affect/Mental Status (Cognitive)  WFL  -HK        Orientation Status (Cognition)  oriented x 4  -HK        Follows Commands (Cognition)  WFL  -HK        Cognitive Function (Cognitive)  attention deficit;safety deficit  -HK        Attention Deficit (Cognitive)  mild deficit  -HK        Safety Deficit (Cognitive)  mild deficit  -HK           Safety Issues, Functional Mobility    Safety Issues Affecting Function (Mobility)  safety precaution awareness;safety precautions follow-through/compliance  -HK           Mobility Assessment/Treatment    Extremity Weight-bearing Status  right upper extremity  -HK        Left Upper Extremity (Weight-bearing Status)  --  -HK        Right Upper Extremity (Weight-bearing Status)  (S) non weight-bearing (NWB)  -HK           Bed Mobility  Assessment/Treatment    Comment (Bed Mobility)  Pt received and left UIC. Pt plans to sleep in recliner at home.  -HK           Functional Mobility    Functional Mobility- Ind. Level  standby assist  -HK        Functional Mobility- Device  -- none  -HK        Functional Mobility-Distance (Feet)  350  -HK        Functional Mobility- Safety Issues  step length decreased  -HK        Functional Mobility- Comment  Pt ambulated 350 feet with SBA and no AE. Pt with O2 saturation >88 throughout. No noted LOB and pt passed mobiltiy screen with score of 23. OT to d/c PT eval.  -HK           Transfer Assessment/Treatment    Transfer Assessment/Treatment  sit-stand transfer;stand-sit transfer  -HK           Sit-Stand Transfer    Sit-Stand [place] (Transfers)  independent  -HK           Stand-Sit Transfer    Stand-Sit [place] (Transfers)  independent  -HK           ADL Assessment/Intervention    BADL Assessment/Intervention  bathing;upper body dressing;lower body dressing;toileting;clothing fastener management  -HK           Bathing Assessment/Intervention    Bathing [place] Level  other (see comments);minimum assist (75% patient effort);verbal cues R axilla  -HK        Bathing Position  unsupported sitting  -HK        Comment (Bathing)  Pt and wife educated on completion of axilla care while maintaining shoulder precautions. Pt completed with [place] and verbal cues from OT to maintain.  -HK           Upper Body Dressing Assessment/Training    Upper Body Dressing [place] Level  doff;don;other (see comments);dependent (less than 25% patient effort);pull-over garment;moderate assist (50% patient effort) sling  -HK        Upper Body Dressing Position  unsupported sitting  -HK        Comment (Upper Body Dressing)  Pt and wife educated on sling management as well as wear and care, shoulder precautions, axilla care, barbara dressing and care of nerve catheter during ADLS. Wife with good teach back on dof/donning sling  and donned it on pt with supervision from OT. Wife provided modA for pt to don pullover shirt via barbara dressing technique.   -HK           Lower Body Dressing Assessment/Training    Lower Body Dressing [place] Level  don;pants/bottoms;undergarment;maximum assist (25% patient effort)  -HK        Lower Body Dressing Position  unsupported sitting;unsupported standing  -HK           Clothes Fastener Management    [place] Level (Clothes Fastener Management)  buttons;zippers;maximum assist (25% patient effort)  -HK        Position (Clothes Fastener Management)  unsupported standing  -HK           Toileting Assessment/Training    Comment (Toileting)  Pt voided independently.  -HK           BADL Safety/Performance    Impairments, BADL Safety/Performance  pain;strength;range of motion  -HK        Skilled BADL Treatment/Intervention  BADL process/adaptation training;hand-over-hand training/cues  -HK           General ROM    RT Upper Ext  -- in sling   -HK        LT Upper Ext  Lt Shoulder Flexion  -HK           Left Upper Ext    Lt Shoulder Flexion AROM  WFL for eval   -HK           MMT (Manual Muscle Testing)    Rt Upper Ext  -- in sling   -HK        Lt Upper Ext  Lt Shoulder WFL  -HK        General MMT Comments  L UE WFL for eval  -HK           Motor Assessment/Interventions    Additional Documentation  Balance (Group);Therapeutic Exercise (Group);Therapeutic Exercise Interventions (Group);Fine Motor Testing & Training (Group)  -HK           Therapeutic Exercise    49170 - OT Therapeutic Exercise Minutes  10  -HK           Therapeutic Exercise    Upper Extremity Range of Motion (Therapeutic Exercise)  elbow flexion/extension, right;forearm supination/pronation, right;wrist flexion/extension, right  -HK        Hand (Therapeutic Exercise)  finger flexion/extension, right  -HK        Exercise Type (Therapeutic Exercise)  AROM (active range of motion);AAROM (active assistive range of motion)  -HK        Position  (Therapeutic Exercise)  seated  -HK        Sets/Reps (Therapeutic Exercise)  1/10  -HK        Comment (Therapeutic Exercise)  OT issued R UE HEP and educated pt and wife on completion. Pt completed AROM/AAROM x10 reps elbow/wrist/hand.  -HK           Balance    Balance  static sitting balance;static standing balance;dynamic sitting balance;dynamic standing balance  -HK           Static Sitting Balance    Level of [place] (Unsupported Sitting, Static Balance)  independent  -HK        Sitting Position (Unsupported Sitting, Static Balance)  sitting in chair  -HK        Time Able to Maintain Position (Unsupported Sitting, Static Balance)  4 to 5 minutes  -HK           Dynamic Sitting Balance    Level of [place], Reaches Outside Midline (Sitting, Dynamic Balance)  independent  -HK        Sitting Position, Reaches Outside Midline (Sitting, Dynamic Balance)  sitting in chair  -HK        Comment, Reaches Outside Midline (Sitting, Dynamic Balance)  completing bathing and dressing   -HK           Static Standing Balance    Level of [place] (Supported Standing, Static Balance)  independent  -HK        Time Able to Maintain Position (Supported Standing, Static Balance)  1 to 2 minutes  -HK        Assistive Device Utilized (Supported Standing, Static Balance)  -- none  -HK           Dynamic Standing Balance    Level of [place], Reaches Outside Midline (Standing, Dynamic Balance)  independent  -HK        Time Able to Maintain Position, Reaches Outside Midline (Standing, Dynamic Balance)  1 to 2 minutes  -HK        Comment, Reaches Outside Midline (Standing, Dynamic Balance)  clothing management  -HK           Fine Motor Testing & Training    Fine Motor Tests  other (see comments) finger opposition  -HK        Comment, Fine Motor Coordination  intact  -HK           Sensory Assessment/Intervention    Sensory General Assessment  light touch sensation deficits identified  -HK           Light Touch Sensation  Assessment    Right Upper Extremity: Light Touch Sensation Assessment  mild impairment, 75% or more correct responses  -HK           Positioning and Restraints    Pre-Treatment Position  sitting in chair/recliner  -HK        Post Treatment Position  chair  -HK        In Chair  notified nsg;reclined;call light within reach;encouraged to call for assist;exit alarm on;with family/caregiver  -HK           Pain Assessment    Additional Documentation  Pain Scale: Numbers Pre/Post-Treatment (Group)  -HK           Pain Scale: Numbers Pre/Post-Treatment    Pain Scale: Numbers, Pretreatment  0/10 - no pain  -HK        Pain Scale: Numbers, Post-Treatment  2/10  -HK        Pain Location - Side  Right  -HK        Pain Location - Orientation  generalized  -HK        Pain Location  -- axilla  -HK        Pain Intervention(s)  Repositioned;Ambulation/increased activity  -HK           Orthotics & Prosthetics Management    Orthosis Location  upper extremity orthosis  -HK        Additional Documentation  Orthosis Location (Row)  -HK           Upper Extremity Orthosis Management    Type (Upper Extremity Orthosis)  Don Sana Ultra II Sling   -HK        Functional Design (Upper Extremity Orthosis)  static orthosis  -HK        Therapeutic Indications (Upper Extremity Orthosis)  post-op positioning/protection;stabilization and support  -HK        Wearing Schedule (Upper Extremity Orthosis)  remove for hygiene/bathing;remove for exercise  -HK        Orthosis Training (Upper Extremity Orthosis)  patient and caregiver;activity limitations/precautions;all orthosis skills;cleaning/care of orthosis;donning/doffing orthosis;orthosis adjustment;orthosis maintenance;purpose/goals of orthosis;sleeping precautions;wearing schedule;able to verbalize training;able to teach back training;meets goals/outcomes  -HK        Compliance/Wearing Issues (Upper Extremity Orthosis)  patient/caregiver comprehend rationale for orthosis;patient/caregiver comprehend  strategies  -HK           Wound Right shoulder Incision    Wound - Properties Group Side: Right  -LD Location: shoulder  -LD Primary Wound Type: Incision  -LD       Coping    Observed Emotional State  accepting;calm;cooperative  -HK           Plan of Care Review    Plan of Care Reviewed With  patient;spouse  -HK        Progress  improving  -HK        Outcome Summary  OT eval complete. Pt and wife educated on sling management as well as wear and care, shoulder precautions, axilla care, barbara dressing, and care of nerve catheter during ADLS. Wife with excellent teach back on sling management and provided modA for UBD via barbara dressing  technique. Pt completed AROM/AAROM x10 reps elbow/wrist/hand. Pt ambulated 350 feet with SBA and no AE. Pt with O2 saturation >88 throughout all mobility. Pt passed mobility screen and OT to d/c PT order at this time. Pt does need frequent verbal cues to maintain shoulder precautions and wife is aware. Recommend d/c home with assist.   -HK           Clinical Impression (OT)    Date of Referral to OT  08/13/20  -HK        OT Diagnosis  Decreased independence with ADLS and mobility   -HK        Patient/Family Goals Statement (OT Eval)  Pt would like to improve and return home.  -HK        Criteria for Skilled Therapeutic Interventions Met (OT Eval)  yes;treatment indicated  -HK        Rehab Potential (OT Eval)  good, to achieve stated therapy goals  -HK        Therapy Frequency (OT Eval)  daily  -HK        Care Plan Review (OT)  care plan/treatment goals reviewed;evaluation/treatment results reviewed;risks/benefits reviewed;patient/other agree to care plan  -HK        Anticipated Equipment Needs at Discharge (OT)  -- none  -HK        Anticipated Discharge Disposition (OT)  home with assist  -HK           Vital Signs    Pre Systolic BP Rehab  -- RN cleared for tx; VSS   -HK        Pre SpO2 (%)  94  -HK        O2 Delivery Pre Treatment  room air  -HK        Intra SpO2 (%)  88  -HK        O2  Delivery Intra Treatment  room air  -HK        Post SpO2 (%)  96  -HK        O2 Delivery Post Treatment  room air  -HK        Pre Patient Position  Sitting  -HK        Intra Patient Position  Standing  -HK        Post Patient Position  Sitting  -HK           OT Goals    Transfer Goal Selection (OT)  transfer, OT goal 1  -HK        Dressing Goal Selection (OT)  dressing, OT goal 1  -HK        ROM Goal Selection (OT)  ROM, OT goal 1  -HK        Precaution Goal Selection (OT)  precaution, OT goal 1  -HK        Problem Specific Goal Selection (OT)  --  -HK        Additional Documentation  Precaution Goal Selection (OT) (Row);ROM Goal Selection (OT) (Row)  -HK           Transfer Goal 1 (OT)    Activity/Assistive Device (Transfer Goal 1, OT)  sit-to-stand/stand-to-sit;toilet  -HK        [place] Level/Cues Needed (Transfer Goal 1, OT)  independent  -HK        Time Frame (Transfer Goal 1, OT)  5 days  -HK        Progress/Outcome (Transfer Goal 1, OT)  goal met  -HK           Dressing Goal 1 (OT)    Activity/Assistive Device (Dressing Goal 1, OT)  upper body dressing;other (see comments) Wife will be able to dof/don sling   -HK        [place]/Cues Needed (Dressing Goal 1, OT)  supervision required  -HK        Time Frame (Dressing Goal 1, OT)  5 days  -HK        Progress/Outcome (Dressing Goal 1, OT)  goal ongoing  -HK           ROM Goal 1 (OT)    ROM Goal 1 (OT)  Pt and wife will be able to adequately demonstrate R UE HEP.  -HK        Time Frame (ROM Goal 1, OT)  5 days  -HK        Progress/Outcome (ROM Goal 1, OT)  goal met  -HK           Precaution Goal 1 (OT)    Activity (Precaution Goal 1, OT)  weight bearing restrictions;other (see comments) NWB R UE; R shoulder precautions  -HK        [place] Level/Cues Needed (Precautions Goal 1, OT)  independently  -HK        Time Frame (Precaution Goal 1, OT)  5 days  -HK        Progress/Outcome (Precaution Goal 1, OT)  goal met  -HK           Discharge Summary  (Occupational Therapy)    Additional Documentation  Discharge Summary, OT Eval (Group)  -HK           Discharge Summary, OT Eval    Reason for Discharge (OT Discharge Summary)  patient met all goals and outcomes  -HK        Outcomes Achieved Upon Discharge (OT Discharge Summary)  able to achieve all goals within established timeline  -HK        Transfer to Another Level of Care or Facility (OT Discharge Summary)  patient will continue therapy goals following discharge  -HK           Living Environment    Home Accessibility  other (see comments) pt reports no stairs an dhas walk in shower   -HK          User Key  (r) = Recorded By, (t) = Taken By, (c) = Cosigned By    Initials Name Effective Dates    LD Moni Ferro RN 06/16/16 -     HK Joyce Khan OT 03/07/18 -           Occupational Therapy Education                 Title: PT OT SLP Therapies (Done)     Topic: Occupational Therapy (Done)     Point: ADL training (Done)     Description:   Instruct learner(s) on proper safety adaptation and remediation techniques during self care or transfers.   Instruct in proper use of assistive devices.              Learning Progress Summary           Patient Acceptance, E, VU by  at 8/14/2020 1009    Comment:  Pt and wife educated on sling management, shoulder precautions, axilla care, barbara dressing, and care of nerve catheter. Educated on safety precautions, R UE HEP, and appropriate body  mechanics.                   Point: Home exercise program (Done)     Description:   Instruct learner(s) on appropriate technique for monitoring, assisting and/or progressing therapeutic exercises/activities.              Learning Progress Summary           Patient Acceptance, E, VU by HK at 8/14/2020 1009    Comment:  Pt and wife educated on sling management, shoulder precautions, axilla care, barbara dressing, and care of nerve catheter. Educated on safety precautions, R UE HEP, and appropriate body  mechanics.                   Point:  Precautions (Done)     Description:   Instruct learner(s) on prescribed precautions during self-care and functional transfers.              Learning Progress Summary           Patient Acceptance, E, VU by  at 8/14/2020 1009    Comment:  Pt and wife educated on sling management, shoulder precautions, axilla care, barbara dressing, and care of nerve catheter. Educated on safety precautions, R UE HEP, and appropriate body  mechanics.                   Point: Body mechanics (Done)     Description:   Instruct learner(s) on proper positioning and spine alignment during self-care, functional mobility activities and/or exercises.              Learning Progress Summary           Patient Acceptance, E, VU by  at 8/14/2020 1009    Comment:  Pt and wife educated on sling management, shoulder precautions, axilla care, barbara dressing, and care of nerve catheter. Educated on safety precautions, R UE HEP, and appropriate body  mechanics.                               User Key     Initials Effective Dates Name Provider Type Discipline     03/07/18 -  Joyce Khan, OT Occupational Therapist OT                OT Recommendation and Plan  Outcome Summary/Treatment Plan (OT)  Anticipated Equipment Needs at Discharge (OT): (none)  Anticipated Discharge Disposition (OT): home with assist  Reason for Discharge (OT Discharge Summary): patient met all goals and outcomes  Therapy Frequency (OT Eval): daily  Plan of Care Review  Plan of Care Reviewed With: patient, spouse  Plan of Care Reviewed With: patient, spouse  Outcome Summary: OT eval complete. Pt and wife educated on sling management as well as wear and care, shoulder precautions, axilla care, barbara dressing, and care of nerve catheter during ADLS. Wife with excellent teach back on sling management and provided modA for UBD via barbara dressing  technique. Pt completed AROM/AAROM x10 reps elbow/wrist/hand. Pt ambulated 350 feet with SBA and no AE. Pt with O2 saturation >88 throughout all  mobility. Pt passed mobility screen and OT to d/c PT order at this time. Pt does need frequent verbal cues to maintain shoulder precautions and wife is aware. Recommend d/c home with assist.      Rehab Goal Summary     Row Name 08/14/20 1009             Occupational Therapy Goals    Transfer Goal Selection (OT)  transfer, OT goal 1  -HK      Dressing Goal Selection (OT)  dressing, OT goal 1  -HK      ROM Goal Selection (OT)  ROM, OT goal 1  -HK      Precaution Goal Selection (OT)  precaution, OT goal 1  -HK      Problem Specific Goal Selection (OT)  --  -HK         Transfer Goal 1 (OT)    Activity/Assistive Device (Transfer Goal 1, OT)  sit-to-stand/stand-to-sit;toilet  -HK      [place] Level/Cues Needed (Transfer Goal 1, OT)  independent  -HK      Time Frame (Transfer Goal 1, OT)  5 days  -HK      Progress/Outcome (Transfer Goal 1, OT)  goal met  -HK         Dressing Goal 1 (OT)    Activity/Assistive Device (Dressing Goal 1, OT)  upper body dressing;other (see comments) Wife will be able to dof/don sling   -HK      [place]/Cues Needed (Dressing Goal 1, OT)  supervision required  -HK      Time Frame (Dressing Goal 1, OT)  5 days  -HK      Progress/Outcome (Dressing Goal 1, OT)  goal ongoing  -HK         ROM Goal 1 (OT)    ROM Goal 1 (OT)  Pt and wife will be able to adequately demonstrate R UE HEP.  -HK      Time Frame (ROM Goal 1, OT)  5 days  -HK      Progress/Outcome (ROM Goal 1, OT)  goal met  -HK         Precaution Goal 1 (OT)    Activity (Precaution Goal 1, OT)  weight bearing restrictions;other (see comments) NWB R UE; R shoulder precautions  -HK      [place] Level/Cues Needed (Precautions Goal 1, OT)  independently  -HK      Time Frame (Precaution Goal 1, OT)  5 days  -HK      Progress/Outcome (Precaution Goal 1, OT)  goal met  -HK        User Key  (r) = Recorded By, (t) = Taken By, (c) = Cosigned By    Initials Name Provider Type Discipline    HK Joyce Khan, OT Occupational Therapist OT           Outcome Measures     Row Name 08/14/20 1009             How much help from another is currently needed...    Putting on and taking off regular lower body clothing?  2  -HK      Bathing (including washing, rinsing, and drying)  2  -HK      Toileting (which includes using toilet bed pan or urinal)  3  -HK      Putting on and taking off regular upper body clothing  2  -HK      Taking care of personal grooming (such as brushing teeth)  3  -HK      Eating meals  3  -HK      AM-PAC 6 Clicks Score (OT)  15  -HK         Functional Assessment    Outcome Measure Options  AM-PAC 6 Clicks Daily Activity (OT)  -HK        User Key  (r) = Recorded By, (t) = Taken By, (c) = Cosigned By    Initials Name Provider Type    HK Joyce Khan, OT Occupational Therapist          Time Calculation:   Time Calculation- OT     Row Name 08/14/20 1009             Time Calculation- OT    OT Start Time  1009  -HK      OT Received On  08/14/20  -      OT Goal Re-Cert Due Date  08/24/20  -         Timed Charges    19520 - OT Therapeutic Exercise Minutes  10  -HK      98800 - OT Self Care/Mgmt Minutes  24  -HK        User Key  (r) = Recorded By, (t) = Taken By, (c) = Cosigned By    Initials Name Provider Type    Joyce Santos, OT Occupational Therapist        Therapy Suggested Charges     Code   Minutes Charges    83049 (CPT®) Hc Ot Neuromusc Re Education Ea 15 Min      78003 (CPT®) Hc Ot Ther Proc Ea 15 Min 10 1    33288 (CPT®) Hc Ot Therapeutic Act Ea 15 Min      04472 (CPT®) Hc Ot Manual Therapy Ea 15 Min      89616 (CPT®) Hc Ot Iontophoresis Ea 15 Min      96591 (CPT®) Hc Ot Elec Stim Ea-Per 15 Min      93422 (CPT®) Hc Ot Ultrasound Ea 15 Min      94642 (CPT®) Hc Ot Self Care/Mgmt/Train Ea 15 Min 24 1    Total  34 2        Therapy Charges for Today     Code Description Service Date Service Provider Modifiers Qty    12396024501 HC OT SELF CARE/MGMT/TRAIN EA 15 MIN 8/14/2020 Joyce Khan, OT GO 1    39360071198 HC OT THER PROC EA 15  MIN 8/14/2020 Joyce Khan, OT GO 1    04452180317 HC OT EVAL LOW COMPLEXITY 3 8/14/2020 Joyce Khan OT GO 1               OT Discharge Summary  Anticipated Discharge Disposition (OT): home with assist    Joyce Khan OT  8/14/2020

## 2020-08-14 NOTE — PLAN OF CARE
Patient alert and oriented. VSS. No complaints of pain during shift. Dressing CDI. Arm sling in place. Pt voided x1. Pt sleeping between care rounds.

## 2020-08-14 NOTE — PROGRESS NOTES
Diane    Acute pain service Inpatient Progress Note    Patient Name: Scotty Gore  :  1940  MRN:  [id number]        Acute Pain  Service Inpatient Progress Note:    Analgesia:Excellent  Pain Score:0/10  LOC: alert and awake  Resp Status: room air  Cardiac: VS stable  Side Effects:None  Catheter Site:clean, dry and dressing intact  Cath type: peripheral nerve cath with ON Q  Infusion rate: 6ml/hr  Dosing/Volume: ropivacaine 0.2%  Catheter Plan:Catheter to remain Insitu and Continue catheter infusion rate unchanged

## 2020-08-14 NOTE — PROGRESS NOTES
Orthopedic Daily Progress Note      CC: s/p L Reverse TSA    Pain well controlled  General: no fevers, chills  Abdomen: no nausea, vomiting, or diarrhea    No other complaints    Physical Exam:  I have reviewed the vital signs.  Temp:  [96.8 °F (36 °C)-98.2 °F (36.8 °C)] 98.1 °F (36.7 °C)  Heart Rate:  [47-96] 96  Resp:  [16-20] 18  BP: (119-158)/() 148/97    Objective  General Appearance:    Alert, cooperative, no distress  Extremities: No clubbing, cyanosis, or edema to lower extremities  Pulses:  2+ in distal surgical extremity  Skin: Dressing Clean/dry/intact      Results Review:    I have reviewed the labs, radiology results and diagnostic studies:    Results from last 7 days   Lab Units 08/14/20  0745   WBC 10*3/mm3 8.87   HEMOGLOBIN g/dL 13.2   PLATELETS 10*3/mm3 189     Results from last 7 days   Lab Units 08/14/20  0745   SODIUM mmol/L 136   POTASSIUM mmol/L 4.2   CO2 mmol/L 23.0   CREATININE mg/dL 1.11   GLUCOSE mg/dL 283*       I have reviewed the medications.    Assessment/Problem List  POD# 1 Day Post-Op   S/p L reverse TSA    Plan  1) OT/PT  2) po pain meds/ISB catheter  3) dressing to remain in place      Discharge Planning: I expect patient to be discharged to home today if medically cleared and cleared by PT/OT    Steven Raphael MD  08/14/20  10:16

## 2020-08-16 NOTE — PROGRESS NOTES
JOANN Contreras    Nerve Cath Post Op Call    Patient Name: cSotty Gore  :  1940  MRN:  [id number]  Date of Discharge: 2020    Nerve Cath Post Op Call:    Catheter Plan:Patient called, No answer. Message left to call [facility] pain service for any questions or complaints  Patient/Family instructed to call ON CALL anesthesia provider for any questions or problems.  Patient Follow Up:

## 2020-08-17 NOTE — PROGRESS NOTES
JOANN Contreras    Nerve Cath Post Op Call    Patient Name: Scotty Gore  :  1940  MRN:  [id number]  Date of Discharge: 2020    Nerve Cath Post Op Call:    Analgesia:Excellent  Pain Score:0/10  Side Effects:None  Patient Controlled ON Q pump infusion rate: 6ml/hr  Catheter Plan:Will continue with plan at home without changes  Patient/Family instructed to call ON CALL anesthesia provider for any questions or problems.  Patient Follow Up:

## 2020-08-18 NOTE — PROGRESS NOTES
JOANN Contreras    Nerve Cath Post Op Call    Patient Name: Scotty Gore  :  1940  MRN:  [id number]  Date of Discharge: 2020    Nerve Cath Post Op Call:    Catheter Plan:Patient called, No answer. Message left to call [facility] pain service for any questions or complaints  Patient/Family instructed to call ON CALL anesthesia provider for any questions or problems.  Patient Follow Up:

## 2020-08-19 NOTE — PROGRESS NOTES
JOANN Contreras    Nerve Cath Post Op Call    Patient Name: Scotty Gore  :  1940  MRN:  [id number]  Date of Discharge: 2020    Nerve Cath Post Op Call:    Catheter Plan:Patient/Family member report nerve catheter previously discontinued, tip intact  Patient/Family instructed to call ON CALL anesthesia provider for any questions or problems.  Patient Follow Up:

## 2021-03-17 ENCOUNTER — TRANSCRIBE ORDERS (OUTPATIENT)
Dept: ADMINISTRATIVE | Facility: HOSPITAL | Age: 81
End: 2021-03-17

## 2021-03-17 ENCOUNTER — HOSPITAL ENCOUNTER (OUTPATIENT)
Dept: GENERAL RADIOLOGY | Facility: HOSPITAL | Age: 81
Discharge: HOME | End: 2021-03-17
Admitting: FAMILY MEDICINE

## 2021-03-17 DIAGNOSIS — R06.09 DOE (DYSPNEA ON EXERTION): Primary | ICD-10-CM

## 2021-03-17 PROCEDURE — 71046 X-RAY EXAM CHEST 2 VIEWS: CPT

## 2021-04-13 ENCOUNTER — OFFICE VISIT (OUTPATIENT)
Dept: CARDIOLOGY | Facility: CLINIC | Age: 81
End: 2021-04-13

## 2021-04-13 VITALS
OXYGEN SATURATION: 97 % | BODY MASS INDEX: 31.64 KG/M2 | DIASTOLIC BLOOD PRESSURE: 70 MMHG | TEMPERATURE: 97.5 F | HEIGHT: 70 IN | HEART RATE: 66 BPM | WEIGHT: 221 LBS | SYSTOLIC BLOOD PRESSURE: 126 MMHG

## 2021-04-13 DIAGNOSIS — R06.09 DYSPNEA ON EXERTION: ICD-10-CM

## 2021-04-13 DIAGNOSIS — I10 ESSENTIAL HYPERTENSION: ICD-10-CM

## 2021-04-13 DIAGNOSIS — I50.32 CHRONIC DIASTOLIC CONGESTIVE HEART FAILURE (HCC): Primary | ICD-10-CM

## 2021-04-13 DIAGNOSIS — E78.2 MIXED HYPERLIPIDEMIA: ICD-10-CM

## 2021-04-13 PROCEDURE — 99204 OFFICE O/P NEW MOD 45 MIN: CPT | Performed by: INTERNAL MEDICINE

## 2021-04-13 RX ORDER — NITROGLYCERIN 0.4 MG/1
0.4 TABLET SUBLINGUAL AS NEEDED
COMMUNITY

## 2021-04-13 RX ORDER — MELOXICAM 15 MG/1
15 TABLET ORAL AS NEEDED
COMMUNITY

## 2021-04-13 NOTE — PROGRESS NOTES
[facility] at [facility]  Consultation H&P  Scotty Gore  1940  [phone]    VISIT DATE:  01/23/2018    PCP: KARYN Velez MD  [address] [address]    CC:  Chief Complaint   Patient presents with   • Chest Pain     Consult   • Shortness of Breath     Previous cardiac studies and procedures:  January 2018   stress echocardiogram  · The exercise ECG portion of the stress test was negative for clinical and ECG evidence of myocardial ischemia. The Duke Treadmill Score was 5.7. There were occasional PVCs during stress and and frequent PVCs during recovery. There are occasional PACs during recovery.  · The echocardiographic portion of the stress test was abnormal for a decrease in the left ventricular ejection fraction at peak stress as well as hypokinesis of the anterior and apical segments.  · Impressions are consistent with an intermediate risk stress test  · .  · Left ventricular systolic function is normal. Estimated EF = 55%.  · Left ventricular wall thickness is consistent with mild concentric hypertrophy.  · Left ventricular diastolic dysfunction (grade I a) consistent with impaired relaxation.  · Right ventricular cavity is mildly dilated.  Cardiac catheterization  · Mild nonobstructive coronary atherosclerosis visualized in the proximal to mid sections of the major epicardial vessels.  · LVEF 45%, anteroapical, apical and inferior apical hypokinesis  · Elevated LVEDP, 20 to 25 mmHg.  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    ASSESSMENT:   Diagnosis Plan   1. Chronic diastolic congestive heart failure (CMS/HCC)     2. Essential hypertension     3. Mixed hyperlipidemia         PLAN:  Chronic diastolic congestive heart failure: Presenting with [place] heart class II-III symptoms.  Currently appears euvolemic.  Continue current medical therapy.   "Transthoracic echo pending to assess underlying myocardial structure and function.  Depending on results of this evaluation may recommend initiation of low-dose loop diuretic or thiazide diuretic.  No significant clinical suspicion that he is developed symptomatic obstructive coronary artery disease.  With his shuffling gait he would not be able to repeat another treadmill test and I do not think a Genevieve scan myocardial perfusion imaging study is indicated at this time.    History of Present Illness   81-year-old diabetic gentleman use had progressive dyspnea on exertion and intermittent chest discomfort.  He has had shortness of breath for many years with a previous cardiac evaluation in 2018 which revealed diastolic dysfunction and underlying mild hypertensive cardiomyopathy.  More recently he reports dyspnea on exertion in a class II-III pattern.  Has been walking more with a shuffling gait.  Describes some intermittent sharp right-sided chest discomfort which is not associated with exertion.  Has not been tracking blood pressures at home.  Compliant with medical therapy.  Reviewed recent BMP, hemoglobin A1c, and lipid profile.  Twelve-lead EKG is stable compared to previous and only reveals nonspecific ST and T wave abnormalities.    PHYSICAL EXAMINATION:  Vitals:    04/13/21 0829   Temp: 97.5 °F (36.4 °C)   Weight: 100 kg (221 lb)   Height: 177.8 cm (70\")     General Appearance:    Alert, cooperative, no distress, appears stated age   Head:    Normocephalic, without obvious abnormality, atraumatic   Eyes:    conjunctiva/corneas clear, EOM's intact, fundi     benign, both eyes   Ears:    Normal TM's and external ear canals, both ears   Nose:   Nares normal, septum midline, mucosa normal, no drainage    or sinus tenderness   Throat:   Lips, mucosa, and tongue normal; teeth and gums normal   Neck:   Supple, symmetrical, trachea midline, no adenopathy;     thyroid:  no enlargement/tenderness/nodules; no carotid    " bruit or JVD   Back:     Symmetric, no curvature, ROM normal, no CVA tenderness   Lungs:     Clear to auscultation bilaterally, respirations unlabored   Chest Wall:    No tenderness or deformity    Heart:    Regular rate and rhythm, S1 and S2 normal, no murmur, rub   or gallop, normal carotid impulse bilaterally without bruit.   Abdomen:     Soft, non-tender, bowel sounds active all four quadrants,     no masses, no organomegaly   Extremities:   Extremities normal, atraumatic, no cyanosis or edema   Pulses:   2+ and symmetric all extremities   Skin:   Skin color, texture, turgor normal, no rashes or lesions   Lymph nodes:   Cervical, supraclavicular, and axillary nodes normal   Neurologic:   normal strength, sensation intact     throughout       Diagnostic Data:  Procedures  Lab Results   Component Value Date    CHLPL 135 10/09/2014    TRIG 91 01/30/2018    HDL 43 01/30/2018     Lab Results   Component Value Date    GLUCOSE 283 (H) 08/14/2020    BUN 13 08/14/2020    CREATININE 1.11 08/14/2020     08/14/2020    K 4.2 08/14/2020     08/14/2020    CO2 23.0 08/14/2020     Lab Results   Component Value Date    HGBA1C 9.90 (H) 08/04/2020     Lab Results   Component Value Date    WBC 8.87 08/14/2020    HGB 13.2 08/14/2020    HCT 41.3 08/14/2020     08/14/2020       PROBLEM LIST:  Patient Active Problem List   Diagnosis   • Dyspnea on exertion   • Abnormal stress echo   • Rotator cuff arthropathy, right   • Hypertension   • Uncontrolled diabetes mellitus (CMS/HCC)   • Hyperlipidemia   • Renal insufficiency   • S/P reverse total shoulder arthroplasty, right   • Acute postoperative pain       PAST MEDICAL HX  Past Medical History:   Diagnosis Date   • Arthritis    • Asthma    • Cancer (CMS/HCC)     PROSTATE CA   • Diabetes mellitus (CMS/HCC)     DX. 2006, FSBS 1 X 3-4 DAYS   • High cholesterol    • History of kidney stones    • History of radiation therapy     for prostate cancer    • Hypertension    • SOB  (shortness of breath)    • Wears dentures        Allergies  No Known Allergies    Current Medications    Current Outpatient Medications:   •  atorvastatin (LIPITOR) 20 MG tablet, Take 10 mg by mouth Daily., Disp: , Rfl:   •  B Complex-C-E-Zn (B COMPLEX-C-E-ZINC) tablet, Take 1 tablet by mouth Daily., Disp: , Rfl:   •  BREO ELLIPTA 100-25 MCG/INH aerosol powder , Inhale 1 puff As Needed., Disp: , Rfl:   •  docusate sodium (Colace) 100 MG capsule, Take 1 capsule by mouth 2 (Two) Times a Day., Disp: 60 capsule, Rfl: 0  •  glyburide (DIAbeta) 5 MG tablet, Take 5 mg by mouth Daily With Breakfast., Disp: , Rfl:   •  lisinopril (PRINIVIL,ZESTRIL) 20 MG tablet, Take 20 mg by mouth Daily., Disp: , Rfl:   •  metFORMIN (GLUCOPHAGE) 500 MG tablet, Take 1,000 mg by mouth 2 (Two) Times a Day With Meals., Disp: , Rfl:   •  montelukast (SINGULAIR) 10 MG tablet, Take 10 mg by mouth Every Night., Disp: , Rfl:   •  ondansetron (ZOFRAN) 4 MG tablet, Take 1 tablet by mouth Every 8 (Eight) Hours As Needed for post operative nausea, Disp: 30 tablet, Rfl: 0  •  oxybutynin XL (DITROPAN-XL) 10 MG 24 hr tablet, Take 10 mg by mouth Daily., Disp: , Rfl:   •  Ropivacine HCl-NaCl (NAROPIN), 12 mg/hr by Peripheral Nerve route Continuous. Indications: Acute Pain, Disp: , Rfl:   •  tamsulosin (FLOMAX) 0.4 MG capsule 24 hr capsule, Take 1 capsule by mouth Daily., Disp: , Rfl:   •  VENTOLIN  (90 Base) MCG/ACT inhaler, Inhale 1 puff As Needed. PT. STATED THAT HE HAS NOT HAD TO USE THIS YET., Disp: , Rfl:          ROS  Review of Systems   Constitutional: Negative for malaise/fatigue.   Cardiovascular: Positive for chest pain, dyspnea on exertion and orthopnea. Negative for irregular heartbeat, leg swelling, near-syncope, palpitations and syncope.   Respiratory: Positive for shortness of breath. Negative for cough, hemoptysis, snoring and sputum production.    Musculoskeletal: Positive for arthritis.   Neurological: Positive for weakness.     All  other body systems reviewed and are negative    SOCIAL HX  Social History     Socioeconomic History   • Marital status:      Spouse name: Not on file   • Number of children: Not on file   • Years of education: Not on file   • Highest education level: Not on file   Tobacco Use   • Smoking status: Never Smoker   • Smokeless tobacco: Never Used   Substance and Sexual Activity   • Alcohol use: No   • Drug use: No   • Sexual activity: Defer       FAMILY HX  No family history on file.          Bradley Sandhu III, MD, FACC

## 2021-05-14 ENCOUNTER — APPOINTMENT (OUTPATIENT)
Dept: CARDIOLOGY | Facility: HOSPITAL | Age: 81
End: 2021-05-14

## 2021-05-26 ENCOUNTER — HOSPITAL ENCOUNTER (OUTPATIENT)
Dept: CARDIOLOGY | Facility: HOSPITAL | Age: 81
Discharge: HOME | End: 2021-05-26
Admitting: INTERNAL MEDICINE

## 2021-05-26 VITALS — HEIGHT: 70 IN | WEIGHT: 221 LBS | BODY MASS INDEX: 31.64 KG/M2

## 2021-05-26 DIAGNOSIS — R06.09 DYSPNEA ON EXERTION: ICD-10-CM

## 2021-05-26 PROCEDURE — 93306 TTE W/DOPPLER COMPLETE: CPT

## 2021-05-26 PROCEDURE — 93306 TTE W/DOPPLER COMPLETE: CPT | Performed by: INTERNAL MEDICINE

## 2021-05-28 LAB
BH CV ECHO MEAS - AO MAX PG (FULL): 3.5 MMHG
BH CV ECHO MEAS - AO MAX PG: 7.5 MMHG
BH CV ECHO MEAS - AO MEAN PG (FULL): 2.4 MMHG
BH CV ECHO MEAS - AO MEAN PG: 4.1 MMHG
BH CV ECHO MEAS - AO ROOT AREA (BSA CORRECTED): 1.6
BH CV ECHO MEAS - AO ROOT AREA: 9 CM^2
BH CV ECHO MEAS - AO ROOT DIAM: 3.4 CM
BH CV ECHO MEAS - AO V2 MAX: 136.7 CM/SEC
BH CV ECHO MEAS - AO V2 MEAN: 95.9 CM/SEC
BH CV ECHO MEAS - AO V2 VTI: 28.4 CM
BH CV ECHO MEAS - ASC AORTA: 3.5 CM
BH CV ECHO MEAS - AVA(I,A): 3.3 CM^2
BH CV ECHO MEAS - AVA(I,D): 3.3 CM^2
BH CV ECHO MEAS - AVA(V,A): 3.1 CM^2
BH CV ECHO MEAS - AVA(V,D): 3.1 CM^2
BH CV ECHO MEAS - BSA(HAYCOCK): 2.3 M^2
BH CV ECHO MEAS - BSA: 2.2 M^2
BH CV ECHO MEAS - BZI_BMI: 31.7 KILOGRAMS/M^2
BH CV ECHO MEAS - BZI_METRIC_HEIGHT: 177.8 CM
BH CV ECHO MEAS - BZI_METRIC_WEIGHT: 100.2 KG
BH CV ECHO MEAS - EDV(CUBED): 112.5 ML
BH CV ECHO MEAS - EDV(MOD-SP2): 167 ML
BH CV ECHO MEAS - EDV(MOD-SP4): 141 ML
BH CV ECHO MEAS - EDV(TEICH): 109 ML
BH CV ECHO MEAS - EF(CUBED): 63.2 %
BH CV ECHO MEAS - EF(MOD-BP): 53 %
BH CV ECHO MEAS - EF(MOD-SP2): 51.5 %
BH CV ECHO MEAS - EF(MOD-SP4): 55.3 %
BH CV ECHO MEAS - EF(TEICH): 54.6 %
BH CV ECHO MEAS - ESV(CUBED): 41.4 ML
BH CV ECHO MEAS - ESV(MOD-SP2): 81 ML
BH CV ECHO MEAS - ESV(MOD-SP4): 63 ML
BH CV ECHO MEAS - ESV(TEICH): 49.4 ML
BH CV ECHO MEAS - FS: 28.4 %
BH CV ECHO MEAS - IVS/LVPW: 0.97
BH CV ECHO MEAS - IVSD: 0.88 CM
BH CV ECHO MEAS - LA DIMENSION: 4.4 CM
BH CV ECHO MEAS - LA/AO: 1.3
BH CV ECHO MEAS - LAD MAJOR: 5.8 CM
BH CV ECHO MEAS - LAT PEAK E' VEL: 15.5 CM/SEC
BH CV ECHO MEAS - LATERAL E/E' RATIO: 2.6
BH CV ECHO MEAS - LV DIASTOLIC VOL/BSA (35-75): 64.7 ML/M^2
BH CV ECHO MEAS - LV IVRT: 0.15 SEC
BH CV ECHO MEAS - LV MASS(C)D: 148.9 GRAMS
BH CV ECHO MEAS - LV MASS(C)DI: 68.4 GRAMS/M^2
BH CV ECHO MEAS - LV MAX PG: 4 MMHG
BH CV ECHO MEAS - LV MEAN PG: 1.7 MMHG
BH CV ECHO MEAS - LV SYSTOLIC VOL/BSA (12-30): 28.9 ML/M^2
BH CV ECHO MEAS - LV V1 MAX: 99.6 CM/SEC
BH CV ECHO MEAS - LV V1 MEAN: 60.8 CM/SEC
BH CV ECHO MEAS - LV V1 VTI: 21.8 CM
BH CV ECHO MEAS - LVIDD: 4.8 CM
BH CV ECHO MEAS - LVIDS: 3.5 CM
BH CV ECHO MEAS - LVLD AP2: 9.6 CM
BH CV ECHO MEAS - LVLD AP4: 10.2 CM
BH CV ECHO MEAS - LVLS AP2: 8.3 CM
BH CV ECHO MEAS - LVLS AP4: 9 CM
BH CV ECHO MEAS - LVOT AREA (M): 4.2 CM^2
BH CV ECHO MEAS - LVOT AREA: 4.3 CM^2
BH CV ECHO MEAS - LVOT DIAM: 2.3 CM
BH CV ECHO MEAS - LVPWD: 0.91 CM
BH CV ECHO MEAS - MED PEAK E' VEL: 8.9 CM/SEC
BH CV ECHO MEAS - MEDIAL E/E' RATIO: 4.6
BH CV ECHO MEAS - MV A MAX VEL: 68.4 CM/SEC
BH CV ECHO MEAS - MV DEC TIME: 0.38 SEC
BH CV ECHO MEAS - MV E MAX VEL: 41.6 CM/SEC
BH CV ECHO MEAS - MV E/A: 0.61
BH CV ECHO MEAS - PA ACC SLOPE: 488.4 CM/SEC^2
BH CV ECHO MEAS - PA ACC TIME: 0.12 SEC
BH CV ECHO MEAS - PA PR(ACCEL): 23.5 MMHG
BH CV ECHO MEAS - PI END-D VEL: 92.3 CM/SEC
BH CV ECHO MEAS - RAP SYSTOLE: 5 MMHG
BH CV ECHO MEAS - RVSP: 38 MMHG
BH CV ECHO MEAS - SI(AO): 117.8 ML/M^2
BH CV ECHO MEAS - SI(CUBED): 32.7 ML/M^2
BH CV ECHO MEAS - SI(LVOT): 43 ML/M^2
BH CV ECHO MEAS - SI(MOD-SP2): 39.5 ML/M^2
BH CV ECHO MEAS - SI(MOD-SP4): 35.8 ML/M^2
BH CV ECHO MEAS - SI(TEICH): 27.3 ML/M^2
BH CV ECHO MEAS - SV(AO): 256.6 ML
BH CV ECHO MEAS - SV(CUBED): 71.1 ML
BH CV ECHO MEAS - SV(LVOT): 93.7 ML
BH CV ECHO MEAS - SV(MOD-SP2): 86 ML
BH CV ECHO MEAS - SV(MOD-SP4): 78 ML
BH CV ECHO MEAS - SV(TEICH): 59.5 ML
BH CV ECHO MEAS - TAPSE (>1.6): 2.4 CM
BH CV ECHO MEAS - TR MAX PG: 33 MMHG
BH CV ECHO MEAS - TR MAX VEL: 274.1 CM/SEC
BH CV ECHO MEASUREMENTS AVERAGE E/E' RATIO: 3.41
BH CV VAS BP LEFT ARM: NORMAL MMHG
BH CV XLRA - RV BASE: 5.6 CM
BH CV XLRA - RV LENGTH: 8.6 CM
BH CV XLRA - RV MID: 5.3 CM
LEFT ATRIUM VOLUME INDEX: 34.4 ML/M^2
LEFT ATRIUM VOLUME: 75 ML
MAXIMAL PREDICTED HEART RATE: 139 BPM
STRESS TARGET HR: 118 BPM

## 2021-06-22 ENCOUNTER — OFFICE VISIT (OUTPATIENT)
Dept: CARDIOLOGY | Facility: CLINIC | Age: 81
End: 2021-06-22

## 2021-06-22 VITALS
DIASTOLIC BLOOD PRESSURE: 64 MMHG | BODY MASS INDEX: 30.97 KG/M2 | SYSTOLIC BLOOD PRESSURE: 110 MMHG | HEIGHT: 70 IN | WEIGHT: 216.3 LBS | HEART RATE: 64 BPM | OXYGEN SATURATION: 95 %

## 2021-06-22 DIAGNOSIS — E78.2 MIXED HYPERLIPIDEMIA: ICD-10-CM

## 2021-06-22 DIAGNOSIS — I10 ESSENTIAL HYPERTENSION: Primary | ICD-10-CM

## 2021-06-22 DIAGNOSIS — I50.32 CHRONIC DIASTOLIC CONGESTIVE HEART FAILURE (HCC): ICD-10-CM

## 2021-06-22 PROCEDURE — 99213 OFFICE O/P EST LOW 20 MIN: CPT | Performed by: INTERNAL MEDICINE

## 2021-06-22 RX ORDER — FUROSEMIDE 20 MG/1
20 TABLET ORAL DAILY
Qty: 30 TABLET | Refills: 11 | Status: SHIPPED | OUTPATIENT
Start: 2021-06-22 | End: 2022-07-18

## 2021-06-22 RX ORDER — OXYBUTYNIN CHLORIDE 15 MG/1
15 TABLET, EXTENDED RELEASE ORAL DAILY
COMMUNITY
Start: 2021-05-05 | End: 2022-07-26

## 2021-06-22 RX ORDER — POTASSIUM CHLORIDE 750 MG/1
10 TABLET, FILM COATED, EXTENDED RELEASE ORAL DAILY
Qty: 30 TABLET | Refills: 11 | Status: SHIPPED | OUTPATIENT
Start: 2021-06-22 | End: 2022-07-18

## 2021-06-22 NOTE — PROGRESS NOTES
[facility] at [facility]  Office visit  Scotty Gore  1940  [phone]    VISIT DATE:  01/23/2018    PCP: KARYN Velez MD  [address] [address]    CC:  Chief Complaint   Patient presents with   • Chronic diastolic congestive heart failure   • Shortness of Breath     Previous cardiac studies and procedures:  January 2018   stress echocardiogram  · The exercise ECG portion of the stress test was negative for clinical and ECG evidence of myocardial ischemia. The Duke Treadmill Score was 5.7. There were occasional PVCs during stress and and frequent PVCs during recovery. There are occasional PACs during recovery.  · The echocardiographic portion of the stress test was abnormal for a decrease in the left ventricular ejection fraction at peak stress as well as hypokinesis of the anterior and apical segments.  · Impressions are consistent with an intermediate risk stress test  · .  · Left ventricular systolic function is normal. Estimated EF = 55%.  · Left ventricular wall thickness is consistent with mild concentric hypertrophy.  · Left ventricular diastolic dysfunction (grade I a) consistent with impaired relaxation.  · Right ventricular cavity is mildly dilated.  Cardiac catheterization  · Mild nonobstructive coronary atherosclerosis visualized in the proximal to mid sections of the major epicardial vessels.  · LVEF 45%, anteroapical, apical and inferior apical hypokinesis  · Elevated LVEDP, 20 to 25 mmHg.  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    May 2021 transthoracic echo  · Left ventricular ejection fraction appears to be 51 - 55%.  · Left ventricular diastolic function is consistent with (grade I) impaired relaxation.  · The right ventricular cavity is moderately dilated. Normal systolic function.  · Mild tricuspid valve regurgitation is present.  Estimated right ventricular systolic pressure from tricuspid regurgitation is mildly elevated (35-45 mmHg).    ASSESSMENT:   Diagnosis Plan   1. Essential hypertension     2. Mixed hyperlipidemia     3. Chronic diastolic congestive heart failure (CMS/HCC)         PLAN:  Chronic diastolic congestive heart failure with underlying chronic cor pulmonale secondary to COPD: Mild pulmonary hypertension.  Currently [place] heart class II-III symptoms.  No significant clinical suspicion that he is developed symptomatic obstructive coronary artery disease.  2 to 4-week trial of low-dose Lasix with potassium supplement.  We will continue this therapy if he has an improvement in dyspnea on exertion.    Subjective:  Interval assessment: Stable shortness of breath in a class II pattern in particular with stairs and inclines.  Not using his inhalers consistently as he is worried that he will become dependent on them.  He does report that they do help improve his shortness of breath when he uses them.  Blood pressures running less than 130/80 mmHg.  Discussed results of recent transthoracic echocardiogram.    Initial evaluation: 81-year-old diabetic gentleman use had progressive dyspnea on exertion and intermittent chest discomfort.  He has had shortness of breath for many years with a previous cardiac evaluation in 2018 which revealed diastolic dysfunction and underlying mild hypertensive cardiomyopathy.  More recently he reports dyspnea on exertion in a class II-III pattern.  Has been walking more with a shuffling gait.  Describes some intermittent sharp right-sided chest discomfort which is not associated with exertion.  Has not been tracking blood pressures at home.  Compliant with medical therapy.  Reviewed recent BMP, hemoglobin A1c, and lipid profile.  Twelve-lead EKG is stable compared to previous and only reveals nonspecific ST and T wave abnormalities.    PHYSICAL EXAMINATION:  Vitals:    06/22/21 1357   BP: 110/64   BP  "Location: Right arm   Patient Position: Sitting   Pulse: 64   SpO2: 95%   Weight: 98.1 kg (216 lb 4.8 oz)   Height: 177.8 cm (70\")     General Appearance:    Alert, cooperative, no distress, appears stated age   Head:    Normocephalic, without obvious abnormality, atraumatic   Eyes:    conjunctiva/corneas clear, EOM's intact, fundi     benign, both eyes   Ears:    Normal TM's and external ear canals, both ears   Nose:   Nares normal, septum midline, mucosa normal, no drainage    or sinus tenderness   Throat:   Lips, mucosa, and tongue normal; teeth and gums normal   Neck:   Supple, symmetrical, trachea midline, no adenopathy;     thyroid:  no enlargement/tenderness/nodules; no carotid    bruit or JVD   Back:     Symmetric, no curvature, ROM normal, no CVA tenderness   Lungs:     Clear to auscultation bilaterally, respirations unlabored   Chest Wall:    No tenderness or deformity    Heart:    Regular rate and rhythm, S1 and S2 normal, no murmur, rub   or gallop, normal carotid impulse bilaterally without bruit.   Abdomen:     Soft, non-tender, bowel sounds active all four quadrants,     no masses, no organomegaly   Extremities:   Extremities normal, atraumatic, no cyanosis or edema   Pulses:   2+ and symmetric all extremities   Skin:   Skin color, texture, turgor normal, no rashes or lesions   Lymph nodes:   Cervical, supraclavicular, and axillary nodes normal   Neurologic:   normal strength, sensation intact     throughout       Diagnostic Data:  Procedures  Lab Results   Component Value Date    CHLPL 135 10/09/2014    TRIG 91 01/30/2018    HDL 43 01/30/2018     Lab Results   Component Value Date    GLUCOSE 283 (H) 08/14/2020    BUN 13 08/14/2020    CREATININE 1.11 08/14/2020     08/14/2020    K 4.2 08/14/2020     08/14/2020    CO2 23.0 08/14/2020     Lab Results   Component Value Date    HGBA1C 9.90 (H) 08/04/2020     Lab Results   Component Value Date    WBC 8.87 08/14/2020    HGB 13.2 08/14/2020    " HCT 41.3 08/14/2020     08/14/2020       PROBLEM LIST:  Patient Active Problem List   Diagnosis   • Dyspnea on exertion   • Abnormal stress echo   • Rotator cuff arthropathy, right   • Hypertension   • Uncontrolled diabetes mellitus (CMS/HCC)   • Hyperlipidemia   • Renal insufficiency   • S/P reverse total shoulder arthroplasty, right   • Acute postoperative pain       PAST MEDICAL HX  Past Medical History:   Diagnosis Date   • Arthritis    • Asthma    • Cancer (CMS/HCC)     PROSTATE CA   • Diabetes mellitus (CMS/HCC)     DX. 2006, FSBS 1 X 3-4 DAYS   • High cholesterol    • History of kidney stones    • History of radiation therapy     for prostate cancer    • Hypertension    • SOB (shortness of breath)    • Wears dentures        Allergies  No Known Allergies    Current Medications    Current Outpatient Medications:   •  atorvastatin (LIPITOR) 20 MG tablet, Take 10 mg by mouth Daily., Disp: , Rfl:   •  B Complex-C-E-Zn (B COMPLEX-C-E-ZINC) tablet, Take 1 tablet by mouth Daily., Disp: , Rfl:   •  BREO ELLIPTA 100-25 MCG/INH aerosol powder , Inhale 1 puff As Needed., Disp: , Rfl:   •  docusate sodium (Colace) 100 MG capsule, Take 1 capsule by mouth 2 (Two) Times a Day., Disp: 60 capsule, Rfl: 0  •  glyburide (DIAbeta) 5 MG tablet, Take 5 mg by mouth Daily With Breakfast., Disp: , Rfl:   •  lisinopril (PRINIVIL,ZESTRIL) 20 MG tablet, Take 20 mg by mouth 2 (Two) Times a Day., Disp: , Rfl:   •  meloxicam (MOBIC) 15 MG tablet, Take 15 mg by mouth Daily., Disp: , Rfl:   •  metFORMIN (GLUCOPHAGE) 500 MG tablet, Take 1,000 mg by mouth 2 (Two) Times a Day With Meals., Disp: , Rfl:   •  montelukast (SINGULAIR) 10 MG tablet, Take 10 mg by mouth Every Night., Disp: , Rfl:   •  Multiple Vitamins-Minerals (PRESERVISION AREDS 2 PO), Take 1 tablet by mouth Daily., Disp: , Rfl:   •  nitroglycerin (NITROSTAT) 0.4 MG SL tablet, Place 0.4 mg under the tongue As Needed for Chest Pain. Take no more than 3 doses in 15 minutes.,  Disp: , Rfl:   •  ondansetron (ZOFRAN) 4 MG tablet, Take 1 tablet by mouth Every 8 (Eight) Hours As Needed for post operative nausea, Disp: 30 tablet, Rfl: 0  •  oxybutynin XL (DITROPAN XL) 15 MG 24 hr tablet, Take 15 mg by mouth Daily., Disp: , Rfl:   •  tamsulosin (FLOMAX) 0.4 MG capsule 24 hr capsule, Take 1 capsule by mouth Daily., Disp: , Rfl:   •  VENTOLIN  (90 Base) MCG/ACT inhaler, Inhale 1 puff As Needed. PT. STATED THAT HE HAS NOT HAD TO USE THIS YET., Disp: , Rfl:          ROS  Review of Systems   Constitutional: Negative for malaise/fatigue.   Cardiovascular: Positive for chest pain, dyspnea on exertion and orthopnea. Negative for irregular heartbeat, leg swelling, near-syncope, palpitations and syncope.   Respiratory: Positive for shortness of breath. Negative for cough, hemoptysis, snoring and sputum production.    Musculoskeletal: Positive for arthritis.   Neurological: Positive for weakness.     All other body systems reviewed and are negative    SOCIAL HX  Social History     Socioeconomic History   • Marital status:      Spouse name: Not on file   • Number of children: Not on file   • Years of education: Not on file   • Highest education level: Not on file   Tobacco Use   • Smoking status: Never Smoker   • Smokeless tobacco: Never Used   Substance and Sexual Activity   • Alcohol use: No   • Drug use: No   • Sexual activity: Defer       FAMILY HX  History reviewed. No pertinent family history.          Bradley Sandhu III, MD, FACC

## 2022-01-11 ENCOUNTER — OFFICE VISIT (OUTPATIENT)
Dept: CARDIOLOGY | Facility: CLINIC | Age: 82
End: 2022-01-11

## 2022-01-11 ENCOUNTER — LAB (OUTPATIENT)
Dept: LAB | Facility: HOSPITAL | Age: 82
End: 2022-01-11

## 2022-01-11 VITALS
WEIGHT: 219.6 LBS | DIASTOLIC BLOOD PRESSURE: 68 MMHG | HEART RATE: 70 BPM | OXYGEN SATURATION: 97 % | BODY MASS INDEX: 31.44 KG/M2 | HEIGHT: 70 IN | SYSTOLIC BLOOD PRESSURE: 108 MMHG

## 2022-01-11 DIAGNOSIS — I50.32 CHRONIC DIASTOLIC CONGESTIVE HEART FAILURE: ICD-10-CM

## 2022-01-11 DIAGNOSIS — R06.09 DYSPNEA ON EXERTION: ICD-10-CM

## 2022-01-11 DIAGNOSIS — E78.2 MIXED HYPERLIPIDEMIA: ICD-10-CM

## 2022-01-11 DIAGNOSIS — I10 PRIMARY HYPERTENSION: Primary | ICD-10-CM

## 2022-01-11 LAB
ANION GAP SERPL CALCULATED.3IONS-SCNC: 9.6 MMOL/L (ref 5–15)
BASOPHILS # BLD AUTO: 0.04 10*3/MM3 (ref 0–0.2)
BASOPHILS NFR BLD AUTO: 0.7 % (ref 0–1.5)
BUN SERPL-MCNC: 11 MG/DL (ref 8–23)
BUN/CREAT SERPL: 8.3 (ref 7–25)
CALCIUM SPEC-SCNC: 9.1 MG/DL (ref 8.6–10.5)
CHLORIDE SERPL-SCNC: 105 MMOL/L (ref 98–107)
CO2 SERPL-SCNC: 26.4 MMOL/L (ref 22–29)
CREAT SERPL-MCNC: 1.32 MG/DL (ref 0.76–1.27)
DEPRECATED RDW RBC AUTO: 42.9 FL (ref 37–54)
EOSINOPHIL # BLD AUTO: 0.1 10*3/MM3 (ref 0–0.4)
EOSINOPHIL NFR BLD AUTO: 1.8 % (ref 0.3–6.2)
ERYTHROCYTE [DISTWIDTH] IN BLOOD BY AUTOMATED COUNT: 13 % (ref 12.3–15.4)
GFR SERPL CREATININE-BSD FRML MDRD: 52 ML/MIN/1.73
GLUCOSE SERPL-MCNC: 383 MG/DL (ref 65–99)
HCT VFR BLD AUTO: 41.5 % (ref 37.5–51)
HGB BLD-MCNC: 13.3 G/DL (ref 13–17.7)
IMM GRANULOCYTES # BLD AUTO: 0.02 10*3/MM3 (ref 0–0.05)
IMM GRANULOCYTES NFR BLD AUTO: 0.4 % (ref 0–0.5)
LYMPHOCYTES # BLD AUTO: 0.85 10*3/MM3 (ref 0.7–3.1)
LYMPHOCYTES NFR BLD AUTO: 15.7 % (ref 19.6–45.3)
MCH RBC QN AUTO: 29 PG (ref 26.6–33)
MCHC RBC AUTO-ENTMCNC: 32 G/DL (ref 31.5–35.7)
MCV RBC AUTO: 90.4 FL (ref 79–97)
MONOCYTES # BLD AUTO: 0.54 10*3/MM3 (ref 0.1–0.9)
MONOCYTES NFR BLD AUTO: 10 % (ref 5–12)
NEUTROPHILS NFR BLD AUTO: 3.87 10*3/MM3 (ref 1.7–7)
NEUTROPHILS NFR BLD AUTO: 71.4 % (ref 42.7–76)
NRBC BLD AUTO-RTO: 0 /100 WBC (ref 0–0.2)
NT-PROBNP SERPL-MCNC: 105 PG/ML (ref 0–1800)
PLATELET # BLD AUTO: 161 10*3/MM3 (ref 140–450)
PMV BLD AUTO: 11.5 FL (ref 6–12)
POTASSIUM SERPL-SCNC: 4.2 MMOL/L (ref 3.5–5.2)
RBC # BLD AUTO: 4.59 10*6/MM3 (ref 4.14–5.8)
SODIUM SERPL-SCNC: 141 MMOL/L (ref 136–145)
WBC NRBC COR # BLD: 5.42 10*3/MM3 (ref 3.4–10.8)

## 2022-01-11 PROCEDURE — 99213 OFFICE O/P EST LOW 20 MIN: CPT | Performed by: INTERNAL MEDICINE

## 2022-01-11 PROCEDURE — 85025 COMPLETE CBC W/AUTO DIFF WBC: CPT

## 2022-01-11 PROCEDURE — 36415 COLL VENOUS BLD VENIPUNCTURE: CPT

## 2022-01-11 PROCEDURE — 80048 BASIC METABOLIC PNL TOTAL CA: CPT

## 2022-01-11 PROCEDURE — 83880 ASSAY OF NATRIURETIC PEPTIDE: CPT

## 2022-01-11 NOTE — PROGRESS NOTES
[facility] at [facility]  Office visit  Scotty Gore  1940  [phone]    VISIT DATE:  01/23/2018    PCP: KARYN Velez MD  [address] [address]    CC:  Chief Complaint   Patient presents with   • Chronic diastolic congestive heart failure   • chest soreness     Previous cardiac studies and procedures:  January 2018   stress echocardiogram  · The exercise ECG portion of the stress test was negative for clinical and ECG evidence of myocardial ischemia. The Duke Treadmill Score was 5.7. There were occasional PVCs during stress and and frequent PVCs during recovery. There are occasional PACs during recovery.  · The echocardiographic portion of the stress test was abnormal for a decrease in the left ventricular ejection fraction at peak stress as well as hypokinesis of the anterior and apical segments.  · Impressions are consistent with an intermediate risk stress test  · .  · Left ventricular systolic function is normal. Estimated EF = 55%.  · Left ventricular wall thickness is consistent with mild concentric hypertrophy.  · Left ventricular diastolic dysfunction (grade I a) consistent with impaired relaxation.  · Right ventricular cavity is mildly dilated.  Cardiac catheterization  · Mild nonobstructive coronary atherosclerosis visualized in the proximal to mid sections of the major epicardial vessels.  · LVEF 45%, anteroapical, apical and inferior apical hypokinesis  · Elevated LVEDP, 20 to 25 mmHg.  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    May 2021 transthoracic echo  · Left ventricular ejection fraction appears to be 51 - 55%.  · Left ventricular diastolic function is consistent with (grade I) impaired relaxation.  · The right ventricular cavity is moderately dilated. Normal systolic function.  · Mild tricuspid valve regurgitation is present.  Estimated right ventricular systolic pressure from tricuspid regurgitation is mildly elevated (35-45 mmHg).    ASSESSMENT:   Diagnosis Plan   1. Primary hypertension     2. Mixed hyperlipidemia     3. Dyspnea on exertion  Stress Test With Myocardial Perfusion (1 Day)    Full Pulmonary Function Test With Bronchodilator    proBNP    CBC & Differential       PLAN:  Chronic diastolic congestive heart failure with underlying chronic cor pulmonale secondary to COPD: Mild pulmonary hypertension.  Currently [place] heart class II-III symptoms.  Now with intermittent chest discomfort.  Genevieve scan myocardial perfusion imaging pending.  Repeat PFTs pending.  Otherwise continue current medical therapy.    Subjective:  Interval assessment: Reports worsening shortness of breath in a class II-III pattern.  Intermittent left precordial chest discomfort over the previous 2 to 3 days.  Mild in intensity, nonradiating.  Reviewed most recent laboratory evaluation, poorly controlled diabetes with a hemoglobin A1c of greater than 11.  Compliant with medical therapy.    Initial evaluation: 81-year-old diabetic gentleman use had progressive dyspnea on exertion and intermittent chest discomfort.  He has had shortness of breath for many years with a previous cardiac evaluation in 2018 which revealed diastolic dysfunction and underlying mild hypertensive cardiomyopathy.  More recently he reports dyspnea on exertion in a class II-III pattern.  Has been walking more with a shuffling gait.  Describes some intermittent sharp right-sided chest discomfort which is not associated with exertion.  Has not been tracking blood pressures at home.  Compliant with medical therapy.  Reviewed recent BMP, hemoglobin A1c, and lipid profile.  Twelve-lead EKG is stable compared to previous and only reveals nonspecific ST and T wave abnormalities.    PHYSICAL EXAMINATION:  Vitals:    01/11/22 1034   BP: 108/68   BP Location: Left arm   Patient Position: Sitting  "  Pulse: 70   SpO2: 97%   Weight: 99.6 kg (219 lb 9.6 oz)   Height: 177.8 cm (70\")     General Appearance:    Alert, cooperative, no distress, appears stated age   Head:    Normocephalic, without obvious abnormality, atraumatic   Eyes:    conjunctiva/corneas clear, EOM's intact, fundi     benign, both eyes   Ears:    Normal TM's and external ear canals, both ears   Nose:   Nares normal, septum midline, mucosa normal, no drainage    or sinus tenderness   Throat:   Lips, mucosa, and tongue normal; teeth and gums normal   Neck:   Supple, symmetrical, trachea midline, no adenopathy;     thyroid:  no enlargement/tenderness/nodules; no carotid    bruit or JVD   Back:     Symmetric, no curvature, ROM normal, no CVA tenderness   Lungs:     Clear to auscultation bilaterally, respirations unlabored   Chest Wall:    No tenderness or deformity    Heart:    Regular rate and rhythm, S1 and S2 normal, no murmur, rub   or gallop, normal carotid impulse bilaterally without bruit.   Abdomen:     Soft, non-tender, bowel sounds active all four quadrants,     no masses, no organomegaly   Extremities:   Extremities normal, atraumatic, no cyanosis or edema   Pulses:   2+ and symmetric all extremities   Skin:   Skin color, texture, turgor normal, no rashes or lesions   Lymph nodes:   Cervical, supraclavicular, and axillary nodes normal   Neurologic:   normal strength, sensation intact     throughout       Diagnostic Data:  Procedures  Lab Results   Component Value Date    CHLPL 135 10/09/2014    TRIG 91 01/30/2018    HDL 43 01/30/2018     Lab Results   Component Value Date    GLUCOSE 283 (H) 08/14/2020    BUN 13 08/14/2020    CREATININE 1.11 08/14/2020     08/14/2020    K 4.2 08/14/2020     08/14/2020    CO2 23.0 08/14/2020     Lab Results   Component Value Date    HGBA1C 9.90 (H) 08/04/2020     Lab Results   Component Value Date    WBC 8.87 08/14/2020    HGB 13.2 08/14/2020    HCT 41.3 08/14/2020     08/14/2020 "       PROBLEM LIST:  Patient Active Problem List   Diagnosis   • Dyspnea on exertion   • Abnormal stress echo   • Rotator cuff arthropathy, right   • Hypertension   • Uncontrolled diabetes mellitus (HCC)   • Hyperlipidemia   • Renal insufficiency   • S/P reverse total shoulder arthroplasty, right   • Acute postoperative pain       PAST MEDICAL HX  Past Medical History:   Diagnosis Date   • Arthritis    • Asthma    • Cancer (HCC)     PROSTATE CA   • Diabetes mellitus (HCC)     DX. 2006, FSBS 1 X 3-4 DAYS   • High cholesterol    • History of kidney stones    • History of radiation therapy     for prostate cancer    • Hypertension    • SOB (shortness of breath)    • Wears dentures        Allergies  No Known Allergies    Current Medications    Current Outpatient Medications:   •  atorvastatin (LIPITOR) 20 MG tablet, Take 10 mg by mouth Daily., Disp: , Rfl:   •  B Complex-C-E-Zn (B COMPLEX-C-E-ZINC) tablet, Take 1 tablet by mouth Daily., Disp: , Rfl:   •  BREO ELLIPTA 100-25 MCG/INH aerosol powder , Inhale 1 puff As Needed., Disp: , Rfl:   •  docusate sodium (Colace) 100 MG capsule, Take 1 capsule by mouth 2 (Two) Times a Day., Disp: 60 capsule, Rfl: 0  •  furosemide (LASIX) 20 MG tablet, Take 1 tablet by mouth Daily., Disp: 30 tablet, Rfl: 11  •  glyburide (DIAbeta) 5 MG tablet, Take 5 mg by mouth Daily With Breakfast., Disp: , Rfl:   •  lisinopril (PRINIVIL,ZESTRIL) 20 MG tablet, Take 20 mg by mouth 2 (Two) Times a Day., Disp: , Rfl:   •  meloxicam (MOBIC) 15 MG tablet, Take 15 mg by mouth Daily., Disp: , Rfl:   •  metFORMIN (GLUCOPHAGE) 500 MG tablet, Take 1,000 mg by mouth 2 (Two) Times a Day With Meals., Disp: , Rfl:   •  montelukast (SINGULAIR) 10 MG tablet, Take 10 mg by mouth Every Night., Disp: , Rfl:   •  Multiple Vitamins-Minerals (PRESERVISION AREDS 2 PO), Take 1 tablet by mouth Daily., Disp: , Rfl:   •  nitroglycerin (NITROSTAT) 0.4 MG SL tablet, Place 0.4 mg under the tongue As Needed for Chest Pain. Take  no more than 3 doses in 15 minutes., Disp: , Rfl:   •  ondansetron (ZOFRAN) 4 MG tablet, Take 1 tablet by mouth Every 8 (Eight) Hours As Needed for post operative nausea, Disp: 30 tablet, Rfl: 0  •  oxybutynin XL (DITROPAN XL) 15 MG 24 hr tablet, Take 15 mg by mouth Daily., Disp: , Rfl:   •  potassium chloride 10 MEQ CR tablet, Take 1 tablet by mouth Daily., Disp: 30 tablet, Rfl: 11  •  tamsulosin (FLOMAX) 0.4 MG capsule 24 hr capsule, Take 1 capsule by mouth Daily., Disp: , Rfl:   •  VENTOLIN  (90 Base) MCG/ACT inhaler, Inhale 1 puff As Needed. PT. STATED THAT HE HAS NOT HAD TO USE THIS YET., Disp: , Rfl:          ROS  Review of Systems   Constitutional: Negative for malaise/fatigue.   Cardiovascular: Positive for chest pain, dyspnea on exertion and orthopnea. Negative for irregular heartbeat, leg swelling, near-syncope, palpitations and syncope.   Respiratory: Positive for shortness of breath. Negative for cough, hemoptysis, snoring and sputum production.    Musculoskeletal: Positive for arthritis.   Neurological: Positive for weakness.     All other body systems reviewed and are negative    SOCIAL HX  Social History     Socioeconomic History   • Marital status:    Tobacco Use   • Smoking status: Never Smoker   • Smokeless tobacco: Never Used   Vaping Use   • Vaping Use: Never used   Substance and Sexual Activity   • Alcohol use: No   • Drug use: No   • Sexual activity: Defer       FAMILY HX  History reviewed. No pertinent family history.          Bradley Sandhu III, MD, FACC

## 2022-02-13 ENCOUNTER — APPOINTMENT (OUTPATIENT)
Dept: PREADMISSION TESTING | Facility: HOSPITAL | Age: 82
End: 2022-02-13

## 2022-02-13 LAB — SARS-COV-2 RNA PNL SPEC NAA+PROBE: NOT DETECTED

## 2022-02-13 PROCEDURE — U0004 COV-19 TEST NON-CDC HGH THRU: HCPCS | Performed by: INTERNAL MEDICINE

## 2022-02-13 PROCEDURE — U0005 INFEC AGEN DETEC AMPLI PROBE: HCPCS | Performed by: INTERNAL MEDICINE

## 2022-02-16 ENCOUNTER — HOSPITAL ENCOUNTER (OUTPATIENT)
Dept: PULMONOLOGY | Facility: HOSPITAL | Age: 82
Discharge: HOME | End: 2022-02-16

## 2022-02-16 ENCOUNTER — HOSPITAL ENCOUNTER (OUTPATIENT)
Dept: CARDIOLOGY | Facility: HOSPITAL | Age: 82
Discharge: HOME | End: 2022-02-16

## 2022-02-16 DIAGNOSIS — R06.09 DYSPNEA ON EXERTION: ICD-10-CM

## 2022-02-16 LAB
BH CV REST NUCLEAR ISOTOPE DOSE: 9 MCI
BH CV STRESS BP STAGE 1: NORMAL
BH CV STRESS BP STAGE 3: NORMAL
BH CV STRESS COMMENTS STAGE 1: NORMAL
BH CV STRESS DOSE REGADENOSON STAGE 1: 0.4
BH CV STRESS DURATION MIN STAGE 1: 1
BH CV STRESS DURATION MIN STAGE 2: 1
BH CV STRESS DURATION MIN STAGE 3: 1
BH CV STRESS DURATION MIN STAGE 4: 1
BH CV STRESS DURATION SEC STAGE 1: 10
BH CV STRESS DURATION SEC STAGE 2: 0
BH CV STRESS HR STAGE 1: 64
BH CV STRESS HR STAGE 2: 73
BH CV STRESS HR STAGE 3: 78
BH CV STRESS HR STAGE 4: 70
BH CV STRESS NUCLEAR ISOTOPE DOSE: 32.5 MCI
BH CV STRESS O2 STAGE 1: 98
BH CV STRESS O2 STAGE 2: 97
BH CV STRESS O2 STAGE 3: 97
BH CV STRESS O2 STAGE 4: 97
BH CV STRESS PROTOCOL 1: NORMAL
BH CV STRESS RECOVERY BP: NORMAL MMHG
BH CV STRESS RECOVERY HR: 72 BPM
BH CV STRESS RECOVERY O2: 96 %
BH CV STRESS STAGE 1: 1
BH CV STRESS STAGE 2: 2
BH CV STRESS STAGE 3: 3
BH CV STRESS STAGE 4: 4
LV EF NUC BP: 70 %
MAXIMAL PREDICTED HEART RATE: 138 BPM
PERCENT MAX PREDICTED HR: 57.97 %
STRESS BASELINE BP: NORMAL MMHG
STRESS BASELINE HR: 70 BPM
STRESS O2 SAT REST: 97 %
STRESS PERCENT HR: 68 %
STRESS POST ESTIMATED WORKLOAD: 1 METS
STRESS POST EXERCISE DUR MIN: 4 MIN
STRESS POST EXERCISE DUR SEC: 0 SEC
STRESS POST O2 SAT PEAK: 97 %
STRESS POST PEAK BP: NORMAL MMHG
STRESS POST PEAK HR: 80 BPM
STRESS TARGET HR: 117 BPM

## 2022-02-16 PROCEDURE — 25010000002 REGADENOSON 0.4 MG/5ML SOLUTION: Performed by: INTERNAL MEDICINE

## 2022-02-16 PROCEDURE — 94060 EVALUATION OF WHEEZING: CPT | Performed by: INTERNAL MEDICINE

## 2022-02-16 PROCEDURE — 93017 CV STRESS TEST TRACING ONLY: CPT

## 2022-02-16 PROCEDURE — 0 TECHNETIUM SESTAMIBI: Performed by: INTERNAL MEDICINE

## 2022-02-16 PROCEDURE — 78452 HT MUSCLE IMAGE SPECT MULT: CPT

## 2022-02-16 PROCEDURE — 78452 HT MUSCLE IMAGE SPECT MULT: CPT | Performed by: INTERNAL MEDICINE

## 2022-02-16 PROCEDURE — 93018 CV STRESS TEST I&R ONLY: CPT | Performed by: INTERNAL MEDICINE

## 2022-02-16 PROCEDURE — 94729 DIFFUSING CAPACITY: CPT | Performed by: INTERNAL MEDICINE

## 2022-02-16 PROCEDURE — 94060 EVALUATION OF WHEEZING: CPT

## 2022-02-16 PROCEDURE — 94727 GAS DIL/WSHOT DETER LNG VOL: CPT

## 2022-02-16 PROCEDURE — 94729 DIFFUSING CAPACITY: CPT

## 2022-02-16 PROCEDURE — A9500 TC99M SESTAMIBI: HCPCS | Performed by: INTERNAL MEDICINE

## 2022-02-16 RX ORDER — ALBUTEROL SULFATE 2.5 MG/3ML
2.5 SOLUTION RESPIRATORY (INHALATION) ONCE
Status: COMPLETED | OUTPATIENT
Start: 2022-02-16 | End: 2022-02-16

## 2022-02-16 RX ADMIN — TECHNETIUM TC 99M SESTAMIBI 1 DOSE: 1 INJECTION INTRAVENOUS at 11:20

## 2022-02-16 RX ADMIN — TECHNETIUM TC 99M SESTAMIBI 1 DOSE: 1 INJECTION INTRAVENOUS at 13:05

## 2022-02-16 RX ADMIN — REGADENOSON 0.4 MG: 0.08 INJECTION, SOLUTION INTRAVENOUS at 13:05

## 2022-02-16 RX ADMIN — ALBUTEROL SULFATE 2.5 MG: 2.5 SOLUTION RESPIRATORY (INHALATION) at 10:22

## 2022-02-17 ENCOUNTER — TELEPHONE (OUTPATIENT)
Dept: CARDIOLOGY | Facility: CLINIC | Age: 82
End: 2022-02-17

## 2022-02-17 NOTE — TELEPHONE ENCOUNTER
----- Message from Bradley Sandhu III, MD sent at 2/17/2022  9:14 AM EST -----  Normal stress test.  Normal heart function and no evidence of blockage.

## 2022-02-18 ENCOUNTER — TELEPHONE (OUTPATIENT)
Dept: CARDIOLOGY | Facility: CLINIC | Age: 82
End: 2022-02-18

## 2022-02-18 NOTE — TELEPHONE ENCOUNTER
----- Message from Bradley Sandhu III, MD sent at 2/18/2022  9:00 AM EST -----  No significant abnormalities noted on lung function test.

## 2022-07-18 RX ORDER — POTASSIUM CHLORIDE 750 MG/1
10 TABLET, FILM COATED, EXTENDED RELEASE ORAL DAILY
Qty: 90 TABLET | Refills: 0 | OUTPATIENT
Start: 2022-07-18

## 2022-07-18 RX ORDER — POTASSIUM CHLORIDE 750 MG/1
10 TABLET, FILM COATED, EXTENDED RELEASE ORAL DAILY
Qty: 30 TABLET | Refills: 1 | Status: SHIPPED | OUTPATIENT
Start: 2022-07-18

## 2022-07-18 RX ORDER — POTASSIUM CHLORIDE 750 MG/1
10 TABLET, FILM COATED, EXTENDED RELEASE ORAL DAILY
Qty: 30 TABLET | Refills: 11 | Status: CANCELLED | OUTPATIENT
Start: 2022-07-18

## 2022-07-18 RX ORDER — FUROSEMIDE 20 MG/1
20 TABLET ORAL DAILY
Qty: 30 TABLET | Refills: 11 | Status: CANCELLED | OUTPATIENT
Start: 2022-07-18

## 2022-07-18 RX ORDER — FUROSEMIDE 20 MG/1
20 TABLET ORAL DAILY
Qty: 90 TABLET | Refills: 0 | OUTPATIENT
Start: 2022-07-18

## 2022-07-18 RX ORDER — FUROSEMIDE 20 MG/1
20 TABLET ORAL DAILY
Qty: 30 TABLET | Refills: 1 | Status: SHIPPED | OUTPATIENT
Start: 2022-07-18

## 2022-07-25 NOTE — PROGRESS NOTES
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 Chest pain  1. January 2018 stress echo: LVEF = 55%.  LV wall thickness- mild concentric hypertrophy.  Grade 1 diastolic dysfunction.  RV cavity mildly dilated.  Exercise ECG portion of stress test was negative for clinical or ECG evidence of ischemia.  Occasional PVCs during stress and frequent PVCs during recovery.  Occasional PVCs during recovery.  Echocardiographic portion of stress test was abnormal for decreasing LVEF at peak stress as well as hypokinesis of anterior and apical segments.  Intermediate risk stress test.  2. 1/18 LHC: Mild nonobstructive CAD visualized in proximal to mid sections of major epicardial vessels.  LVEF = 45%.  Anteroapical, apical and inferior apical hypokinesis.  LVEDP 20--25 mmHg.  No AS.  No significant MR.  Mild nonischemic cardiomyopathy likely due to hypertension.  3. 2/22 Lexiscan MPS: LVEF = 70%.  Perfusion imaging indicates normal myocardial perfusion study with no evidence of ischemia.  Low risk study.  2. ALCARAZ with chronic diastolic congestive heart failure with underlying chronic cor pulmonale secondary to COPD  1. 5/21 2D echo: LVEF = 50-55%.  Grade 1 diastolic dysfunction.  RV cavity moderately dilated.  Normal systolic function.  Mild TR.  RVSP due to TR mildly elevated (35-45).  3. Hypertension  4. Hyperlipidemia  1. 11/21 125/323/37/23  5. Diabetes mellitus -diagnosed 2006 1. 11/21 A1c 11.2.  6. Asthma/COPD  7. Nephrolithiasis  8. History of prostate cancer s/p radiation therapy      CC:   Chief Complaint   Patient presents with   • Primary hypertension   • Chronic diastolic congestive heart failure         Allergies  No Known Allergies    Current Medications    Current Outpatient Medications:   •  atorvastatin (LIPITOR) 20 MG tablet, Take 10 mg by mouth Daily., Disp: , Rfl:   •  B Complex-C-E-Zn (B COMPLEX-C-E-ZINC) tablet, Take 1 tablet by mouth Daily., Disp: , Rfl:   •  BREO ELLIPTA 100-25 MCG/INH aerosol powder , Inhale 1 puff As Needed., Disp: , Rfl:   •  docusate sodium (Colace) 100 MG capsule, Take 1 capsule by mouth 2 (Two) Times a Day., Disp: 60 capsule, Rfl: 0  •  furosemide (LASIX) 20 MG tablet, TAKE 1 TABLET BY MOUTH DAILY, Disp: 30 tablet, Rfl: 1  •  glyburide (DIAbeta) 5 MG tablet, Take 5 mg by mouth Daily With Breakfast., Disp: , Rfl:   •  lisinopril (PRINIVIL,ZESTRIL) 20 MG tablet, Take 20 mg by mouth 2 (Two) Times a Day., Disp: , Rfl:   •  meloxicam (MOBIC) 15 MG tablet, Take 15 mg by mouth As Needed., Disp: , Rfl:   •  metFORMIN (GLUCOPHAGE) 500 MG tablet, Take 1,000 mg by mouth 2 (Two) Times a Day With Meals., Disp: , Rfl:   •  montelukast (SINGULAIR) 10 MG tablet, Take 10 mg by mouth Every Night., Disp: , Rfl:   •  Multiple Vitamins-Minerals (PRESERVISION AREDS 2 PO), Take 1 tablet by mouth Daily., Disp: , Rfl:   •  nitroglycerin (NITROSTAT) 0.4 MG SL tablet, Place 0.4 mg under the tongue As Needed for Chest Pain. Take no more than 3 doses in 15 minutes., Disp: , Rfl:   •  potassium chloride 10 MEQ CR tablet, TAKE 1 TABLET BY MOUTH DAILY, Disp: 30 tablet, Rfl: 1  •  tamsulosin (FLOMAX) 0.4 MG capsule 24 hr capsule, Take 1 capsule by mouth Daily., Disp: , Rfl:   •  VENTOLIN  (90 Base) MCG/ACT inhaler, Inhale 1 puff As Needed. PT. STATED THAT HE HAS NOT HAD TO USE THIS YET., Disp: , Rfl:       History of Present Illness   Scotty Gore is a 82 y.o. year old male here for follow up.    Pt denies any chest pain,  orthopnea, PND, palpitations, lower extremity edema, or claudication.  His shortness of breath is unchanged and continue to be problematic.  He is compliant with medication has  "had no crescendo pattern of symptoms.  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    OBJECTIVE:  Vitals:    07/26/22 1023   BP: 122/64   BP Location: Right arm   Patient Position: Sitting   Pulse: 75   SpO2: 95%   Weight: 95.8 kg (211 lb 3.2 oz)   Height: 177.8 cm (70\")     Body mass index is 30.3 kg/m².    Constitutional:       Appearance: Healthy appearance. Not in distress.   Neck:      Vascular: No JVR. JVD normal.   Pulmonary:      Effort: Pulmonary effort is normal.      Breath sounds: Normal breath sounds. No wheezing. No rhonchi. No rales.   Chest:      Chest wall: Not tender to palpatation.   Cardiovascular:      PMI at left midclavicular line. Normal rate. Regular rhythm. Normal S1. Normal S2.      Murmurs: There is no murmur.      No gallop. No click. No rub.   Pulses:     Intact distal pulses.   Edema:     Peripheral edema absent.   Abdominal:      General: Bowel sounds are normal.      Palpations: Abdomen is soft.      Tenderness: There is no abdominal tenderness.   Musculoskeletal: Normal range of motion.         General: No tenderness. Skin:     General: Skin is warm and dry.   Neurological:      General: No focal deficit present.      Mental Status: Alert and oriented to person, place and time.         Diagnostic Data:  Procedures      ASSESSMENT:   Diagnosis Plan   1. Coronary artery disease involving native coronary artery of native heart without angina pectoris     2. Primary hypertension     3. Mixed hyperlipidemia     4. Chronic diastolic congestive heart failure (HCC)         PLAN:  CAD historically nonobstructive no anginal pattern preserved LV function continue GDMT    Hypertension controlled currently tamsulosin lisinopril    Mixed dyslipidemia controlled on statin therapy    Chronic diastolic dysfunction continued activity and control of blood pressure    Diabetes on oral agents with acceptable A1c    Follow Up with Dr. Sandhu in 1 year          Blayne Butler, " MD, FACC

## 2022-07-26 ENCOUNTER — OFFICE VISIT (OUTPATIENT)
Dept: CARDIOLOGY | Facility: CLINIC | Age: 82
End: 2022-07-26

## 2022-07-26 VITALS
WEIGHT: 211.2 LBS | HEART RATE: 75 BPM | OXYGEN SATURATION: 95 % | BODY MASS INDEX: 30.24 KG/M2 | SYSTOLIC BLOOD PRESSURE: 122 MMHG | HEIGHT: 70 IN | DIASTOLIC BLOOD PRESSURE: 64 MMHG

## 2022-07-26 DIAGNOSIS — E78.2 MIXED HYPERLIPIDEMIA: ICD-10-CM

## 2022-07-26 DIAGNOSIS — I50.32 CHRONIC DIASTOLIC CONGESTIVE HEART FAILURE: ICD-10-CM

## 2022-07-26 DIAGNOSIS — I25.10 CORONARY ARTERY DISEASE INVOLVING NATIVE CORONARY ARTERY OF NATIVE HEART WITHOUT ANGINA PECTORIS: Primary | ICD-10-CM

## 2022-07-26 DIAGNOSIS — I10 PRIMARY HYPERTENSION: ICD-10-CM

## 2022-07-26 PROCEDURE — 99214 OFFICE O/P EST MOD 30 MIN: CPT | Performed by: INTERNAL MEDICINE

## 2022-07-26 RX ORDER — NITROGLYCERIN 0.4 MG/1
TABLET SUBLINGUAL
Qty: 100 TABLET | Refills: 11 | Status: SHIPPED | OUTPATIENT
Start: 2022-07-26

## 2023-08-01 ENCOUNTER — OFFICE VISIT (OUTPATIENT)
Dept: CARDIOLOGY | Facility: CLINIC | Age: 83
End: 2023-08-01
Payer: MEDICARE

## 2023-08-01 VITALS
OXYGEN SATURATION: 96 % | BODY MASS INDEX: 29.63 KG/M2 | DIASTOLIC BLOOD PRESSURE: 78 MMHG | SYSTOLIC BLOOD PRESSURE: 138 MMHG | HEART RATE: 75 BPM | HEIGHT: 70 IN | WEIGHT: 207 LBS

## 2023-08-01 DIAGNOSIS — I10 PRIMARY HYPERTENSION: ICD-10-CM

## 2023-08-01 DIAGNOSIS — E78.2 MIXED HYPERLIPIDEMIA: Primary | ICD-10-CM

## 2023-08-01 PROCEDURE — 99213 OFFICE O/P EST LOW 20 MIN: CPT | Performed by: INTERNAL MEDICINE

## 2023-08-01 PROCEDURE — 3078F DIAST BP <80 MM HG: CPT | Performed by: INTERNAL MEDICINE

## 2023-08-01 PROCEDURE — 3075F SYST BP GE 130 - 139MM HG: CPT | Performed by: INTERNAL MEDICINE

## 2023-08-01 RX ORDER — EMPAGLIFLOZIN 25 MG/1
1 TABLET, FILM COATED ORAL DAILY
COMMUNITY
Start: 2023-07-19

## 2023-08-16 ENCOUNTER — HOSPITAL ENCOUNTER (OUTPATIENT)
Dept: GENERAL RADIOLOGY | Facility: HOSPITAL | Age: 83
Discharge: HOME | End: 2023-08-16
Admitting: INTERNAL MEDICINE
Payer: MEDICARE

## 2023-08-16 ENCOUNTER — TRANSCRIBE ORDERS (OUTPATIENT)
Dept: ADMINISTRATIVE | Facility: HOSPITAL | Age: 83
End: 2023-08-16
Payer: MEDICARE

## 2023-08-16 DIAGNOSIS — M79.672 LEFT FOOT PAIN: Primary | ICD-10-CM

## 2023-08-16 DIAGNOSIS — M79.672 LEFT FOOT PAIN: ICD-10-CM

## 2023-08-16 PROCEDURE — 73630 X-RAY EXAM OF FOOT: CPT

## 2024-08-13 ENCOUNTER — OFFICE VISIT (OUTPATIENT)
Dept: CARDIOLOGY | Facility: CLINIC | Age: 84
End: 2024-08-13
Payer: MEDICARE

## 2024-08-13 VITALS
WEIGHT: 202.4 LBS | DIASTOLIC BLOOD PRESSURE: 78 MMHG | HEART RATE: 79 BPM | OXYGEN SATURATION: 95 % | BODY MASS INDEX: 28.98 KG/M2 | HEIGHT: 70 IN | SYSTOLIC BLOOD PRESSURE: 122 MMHG

## 2024-08-13 DIAGNOSIS — R06.09 DYSPNEA ON EXERTION: ICD-10-CM

## 2024-08-13 DIAGNOSIS — E78.2 MIXED HYPERLIPIDEMIA: ICD-10-CM

## 2024-08-13 DIAGNOSIS — I50.32 CHRONIC HEART FAILURE WITH PRESERVED EJECTION FRACTION: ICD-10-CM

## 2024-08-13 DIAGNOSIS — I10 PRIMARY HYPERTENSION: Primary | ICD-10-CM

## 2024-08-13 PROCEDURE — 3074F SYST BP LT 130 MM HG: CPT | Performed by: INTERNAL MEDICINE

## 2024-08-13 PROCEDURE — 3078F DIAST BP <80 MM HG: CPT | Performed by: INTERNAL MEDICINE

## 2024-08-13 PROCEDURE — 99214 OFFICE O/P EST MOD 30 MIN: CPT | Performed by: INTERNAL MEDICINE

## 2024-08-13 PROCEDURE — G2211 COMPLEX E/M VISIT ADD ON: HCPCS | Performed by: INTERNAL MEDICINE

## 2024-08-13 RX ORDER — IBUPROFEN 400 MG/1
TABLET ORAL
COMMUNITY
Start: 2024-05-18

## 2024-08-13 RX ORDER — DOXEPIN HYDROCHLORIDE 10 MG/1
CAPSULE ORAL
COMMUNITY
Start: 2024-08-04

## 2024-08-13 RX ORDER — PIOGLITAZONEHYDROCHLORIDE 30 MG/1
30 TABLET ORAL DAILY
COMMUNITY

## 2024-08-13 RX ORDER — ASPIRIN 81 MG/1
81 TABLET ORAL DAILY
COMMUNITY

## 2024-08-13 RX ORDER — TOLTERODINE TARTRATE 1 MG/1
1 TABLET, EXTENDED RELEASE ORAL
COMMUNITY
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
## 2024-08-23 ENCOUNTER — HOSPITAL ENCOUNTER (OUTPATIENT)
Facility: HOSPITAL | Age: 84
Discharge: HOME | End: 2024-08-23
Payer: MEDICARE

## 2024-08-23 ENCOUNTER — TELEPHONE (OUTPATIENT)
Dept: CARDIOLOGY | Facility: CLINIC | Age: 84
End: 2024-08-23

## 2024-08-23 VITALS
SYSTOLIC BLOOD PRESSURE: 136 MMHG | DIASTOLIC BLOOD PRESSURE: 89 MMHG | HEIGHT: 70 IN | HEART RATE: 66 BPM | BODY MASS INDEX: 28.92 KG/M2 | OXYGEN SATURATION: 95 % | WEIGHT: 202 LBS

## 2024-08-23 VITALS
BODY MASS INDEX: 28.92 KG/M2 | WEIGHT: 202 LBS | HEIGHT: 70 IN | DIASTOLIC BLOOD PRESSURE: 91 MMHG | SYSTOLIC BLOOD PRESSURE: 133 MMHG

## 2024-08-23 DIAGNOSIS — R06.09 DYSPNEA ON EXERTION: ICD-10-CM

## 2024-08-23 LAB
BH CV ECHO MEAS - AO MAX PG: 8.2 MMHG
BH CV ECHO MEAS - AO MEAN PG: 5 MMHG
BH CV ECHO MEAS - AO ROOT DIAM: 3.3 CM
BH CV ECHO MEAS - AO V2 MAX: 143 CM/SEC
BH CV ECHO MEAS - AO V2 VTI: 37.1 CM
BH CV ECHO MEAS - AVA(I,D): 1.89 CM2
BH CV ECHO MEAS - EDV(CUBED): 148.9 ML
BH CV ECHO MEAS - EDV(MOD-SP2): 110 ML
BH CV ECHO MEAS - EDV(MOD-SP4): 119 ML
BH CV ECHO MEAS - EF(MOD-BP): 64.1 %
BH CV ECHO MEAS - EF(MOD-SP2): 66.6 %
BH CV ECHO MEAS - EF(MOD-SP4): 61.6 %
BH CV ECHO MEAS - ESV(CUBED): 35.9 ML
BH CV ECHO MEAS - ESV(MOD-SP2): 36.7 ML
BH CV ECHO MEAS - ESV(MOD-SP4): 45.7 ML
BH CV ECHO MEAS - FS: 37.7 %
BH CV ECHO MEAS - IVS/LVPW: 1 CM
BH CV ECHO MEAS - IVSD: 1.1 CM
BH CV ECHO MEAS - LA DIMENSION: 4.1 CM
BH CV ECHO MEAS - LAT PEAK E' VEL: 8.6 CM/SEC
BH CV ECHO MEAS - LV DIASTOLIC VOL/BSA (35-75): 56.8 CM2
BH CV ECHO MEAS - LV MASS(C)D: 227.7 GRAMS
BH CV ECHO MEAS - LV MAX PG: 2.16 MMHG
BH CV ECHO MEAS - LV MEAN PG: 1 MMHG
BH CV ECHO MEAS - LV SYSTOLIC VOL/BSA (12-30): 21.8 CM2
BH CV ECHO MEAS - LV V1 MAX: 73.4 CM/SEC
BH CV ECHO MEAS - LV V1 VTI: 15.5 CM
BH CV ECHO MEAS - LVIDD: 5.3 CM
BH CV ECHO MEAS - LVIDS: 3.3 CM
BH CV ECHO MEAS - LVOT AREA: 4.5 CM2
BH CV ECHO MEAS - LVOT DIAM: 2.4 CM
BH CV ECHO MEAS - LVPWD: 1.1 CM
BH CV ECHO MEAS - MED PEAK E' VEL: 5.3 CM/SEC
BH CV ECHO MEAS - MV A MAX VEL: 72.8 CM/SEC
BH CV ECHO MEAS - MV DEC TIME: 0.25 SEC
BH CV ECHO MEAS - MV E MAX VEL: 43 CM/SEC
BH CV ECHO MEAS - MV E/A: 0.59
BH CV ECHO MEAS - MV MAX PG: 2.33 MMHG
BH CV ECHO MEAS - MV MEAN PG: 1 MMHG
BH CV ECHO MEAS - MV V2 VTI: 25.6 CM
BH CV ECHO MEAS - MVA(VTI): 2.7 CM2
BH CV ECHO MEAS - PA ACC TIME: 0.12 SEC
BH CV ECHO MEAS - PA V2 MAX: 88.8 CM/SEC
BH CV ECHO MEAS - RAP SYSTOLE: 8 MMHG
BH CV ECHO MEAS - RVSP: 35 MMHG
BH CV ECHO MEAS - SV(LVOT): 70.1 ML
BH CV ECHO MEAS - SV(MOD-SP2): 73.3 ML
BH CV ECHO MEAS - SV(MOD-SP4): 73.3 ML
BH CV ECHO MEAS - SVI(LVOT): 33.5 ML/M2
BH CV ECHO MEAS - SVI(MOD-SP2): 35 ML/M2
BH CV ECHO MEAS - SVI(MOD-SP4): 35 ML/M2
BH CV ECHO MEAS - TAPSE (>1.6): 2.7 CM
BH CV ECHO MEAS - TR MAX PG: 27.6 MMHG
BH CV ECHO MEAS - TR MAX VEL: 262.5 CM/SEC
BH CV ECHO MEASUREMENTS AVERAGE E/E' RATIO: 6.19
BH CV REST NUCLEAR ISOTOPE DOSE: 9.3 MCI
BH CV STRESS BP STAGE 2: NORMAL
BH CV STRESS BP STAGE 4: NORMAL
BH CV STRESS COMMENTS STAGE 1: NORMAL
BH CV STRESS DOSE REGADENOSON STAGE 1: 0.4
BH CV STRESS DURATION MIN STAGE 1: 1
BH CV STRESS DURATION MIN STAGE 2: 1
BH CV STRESS DURATION MIN STAGE 3: 1
BH CV STRESS DURATION MIN STAGE 4: 1
BH CV STRESS DURATION SEC STAGE 1: 0
BH CV STRESS DURATION SEC STAGE 2: 0
BH CV STRESS DURATION SEC STAGE 3: 0
BH CV STRESS DURATION SEC STAGE 4: 0
BH CV STRESS HR STAGE 1: 71
BH CV STRESS HR STAGE 2: 82
BH CV STRESS HR STAGE 3: 77
BH CV STRESS HR STAGE 4: 75
BH CV STRESS NUCLEAR ISOTOPE DOSE: 31.7 MCI
BH CV STRESS O2 STAGE 1: 94
BH CV STRESS O2 STAGE 2: 95
BH CV STRESS O2 STAGE 3: 97
BH CV STRESS O2 STAGE 4: 96
BH CV STRESS PROTOCOL 1: NORMAL
BH CV STRESS RECOVERY BP: NORMAL MMHG
BH CV STRESS RECOVERY HR: 83 BPM
BH CV STRESS RECOVERY O2: 95 %
BH CV STRESS STAGE 1: 1
BH CV STRESS STAGE 2: 2
BH CV STRESS STAGE 3: 3
BH CV STRESS STAGE 4: 4
BH CV VAS BP RIGHT ARM: NORMAL MMHG
BH CV XLRA - RV BASE: 3.8 CM
BH CV XLRA - RV LENGTH: 6.3 CM
BH CV XLRA - RV MID: 3.9 CM
BH CV XLRA - TDI S': 11.3 CM/SEC
LV EF NUC BP: 60 %
MAXIMAL PREDICTED HEART RATE: 136 BPM
PERCENT MAX PREDICTED HR: 62.5 %
STRESS BASELINE BP: NORMAL MMHG
STRESS BASELINE HR: 66 BPM
STRESS O2 SAT REST: 95 %
STRESS PERCENT HR: 74 %
STRESS POST ESTIMATED WORKLOAD: 1 METS
STRESS POST EXERCISE DUR MIN: 4 MIN
STRESS POST EXERCISE DUR SEC: 0 SEC
STRESS POST O2 SAT PEAK: 97 %
STRESS POST PEAK BP: NORMAL MMHG
STRESS POST PEAK HR: 85 BPM
STRESS TARGET HR: 116 BPM

## 2024-08-23 PROCEDURE — 93306 TTE W/DOPPLER COMPLETE: CPT

## 2024-08-23 PROCEDURE — 93017 CV STRESS TEST TRACING ONLY: CPT

## 2024-08-23 PROCEDURE — 0 TECHNETIUM SESTAMIBI: Performed by: INTERNAL MEDICINE

## 2024-08-23 PROCEDURE — 93306 TTE W/DOPPLER COMPLETE: CPT | Performed by: INTERNAL MEDICINE

## 2024-08-23 PROCEDURE — A9500 TC99M SESTAMIBI: HCPCS | Performed by: INTERNAL MEDICINE

## 2024-08-23 PROCEDURE — 25010000002 SULFUR HEXAFLUORIDE MICROSPH 60.7-25 MG RECONSTITUTED SUSPENSION: Performed by: INTERNAL MEDICINE

## 2024-08-23 PROCEDURE — 25010000002 REGADENOSON 0.4 MG/5ML SOLUTION: Performed by: INTERNAL MEDICINE

## 2024-08-23 PROCEDURE — 78452 HT MUSCLE IMAGE SPECT MULT: CPT

## 2024-08-23 RX ORDER — REGADENOSON 0.08 MG/ML
0.4 INJECTION, SOLUTION INTRAVENOUS ONCE
Status: COMPLETED | OUTPATIENT
Start: 2024-08-23 | End: 2024-08-23

## 2024-08-23 RX ADMIN — SULFUR HEXAFLUORIDE 2 ML: KIT at 09:48

## 2024-08-23 RX ADMIN — REGADENOSON 0.4 MG: 0.08 INJECTION INTRAVENOUS at 10:32

## 2024-08-23 RX ADMIN — TECHNETIUM TC 99M SESTAMIBI 1 DOSE: 1 INJECTION INTRAVENOUS at 10:35

## 2024-08-23 RX ADMIN — TECHNETIUM TC 99M SESTAMIBI 1 DOSE: 1 INJECTION INTRAVENOUS at 08:50

## 2024-08-23 NOTE — TELEPHONE ENCOUNTER
Called patient to report message above from . No answer. Voice mailbox full. Unable to leave message.

## 2024-08-23 NOTE — TELEPHONE ENCOUNTER
----- Message from Bradley Sandhu sent at 8/23/2024  1:51 PM EDT -----  Normal heart function noted on echo and stress test.  No concerning abnormalities found.

## 2024-11-12 ENCOUNTER — OFFICE VISIT (OUTPATIENT)
Dept: CARDIOLOGY | Facility: CLINIC | Age: 84
End: 2024-11-12
Payer: MEDICARE

## 2024-11-12 VITALS
HEART RATE: 68 BPM | OXYGEN SATURATION: 97 % | SYSTOLIC BLOOD PRESSURE: 128 MMHG | HEIGHT: 71 IN | BODY MASS INDEX: 29.96 KG/M2 | WEIGHT: 214 LBS | DIASTOLIC BLOOD PRESSURE: 80 MMHG

## 2024-11-12 DIAGNOSIS — I50.32 CHRONIC HEART FAILURE WITH PRESERVED EJECTION FRACTION: Primary | ICD-10-CM

## 2024-11-12 DIAGNOSIS — I10 PRIMARY HYPERTENSION: ICD-10-CM

## 2024-11-12 DIAGNOSIS — E78.2 MIXED HYPERLIPIDEMIA: ICD-10-CM

## 2024-11-12 PROCEDURE — 99214 OFFICE O/P EST MOD 30 MIN: CPT | Performed by: INTERNAL MEDICINE

## 2024-11-12 PROCEDURE — 3079F DIAST BP 80-89 MM HG: CPT | Performed by: INTERNAL MEDICINE

## 2024-11-12 PROCEDURE — 93000 ELECTROCARDIOGRAM COMPLETE: CPT | Performed by: INTERNAL MEDICINE

## 2024-11-12 PROCEDURE — 3074F SYST BP LT 130 MM HG: CPT | Performed by: INTERNAL MEDICINE

## 2024-11-12 RX ORDER — FLUTICASONE PROPIONATE 50 MCG
2 SPRAY, SUSPENSION (ML) NASAL DAILY
COMMUNITY
Start: 2024-09-19

## 2024-11-12 RX ORDER — CLOTRIMAZOLE AND BETAMETHASONE DIPROPIONATE 10; .64 MG/G; MG/G
CREAM TOPICAL
COMMUNITY
Start: 2024-10-14
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
## 2025-05-09 NOTE — H&P (VIEW-ONLY)
-------------------------------------------------------------------------------  Attestation signed by Rodney Prieto MD at 5/15/2025  8:39 AM  Signature only.  -------------------------------------------------------------------------------    ORTHOPAEDIC SURGERY TRAUMA CONSULT NOTE        Consult Received:  2030  Patient Examined:  2040    CHIEF COMPLAINT AND REASON FOR VISIT     Right periprosthetic proximal humerus fracture    HISTORY OF PRESENT ILLNESS      Anurag Gore is a 85 y.o. 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  He was brought to the ED where he was found to have a right periprosthetic proximal humerus fracture and Orthopedic surgery was consulted for evaluation.  This is isolated injury at this time.  States that he had his right shoulder replaced proximally 5 years ago at [facility].  He does not remember the name of the surgeon who did his procedure.  He had no significant issues with the shoulder prior to this fall.    Reactions to Metal: Denies  MRSA Hx: Denies  Prior DVT/PE: Denies  Anticoagulants:  Denies    PAST MEDICAL HISTORY        Medical History[1]    MEDICATIONS  Current Medications[2]      ALLERGIES  Allergies[3]      PAST SURGICAL HISTORY     Surgical History[4]     FAMILY HISTORY      Reviewed, Noncontributory.    SOCIAL HISTORY      Tobacco: denies  EtOH: denies  Illicits: denies  Lives:  [place], [place]  Employment:  Retired but works on a farm     REVIEW OF SYSTEMS      14 point review of systems conducted and was otherwise negative except for mentioned in HPI    PHYSICAL EXAMINATION        General Physical Exam   Constitutional No acute distress, Vitals as below   Head Normocephalic and atraumatic, appropriate dentition   Cardiovascular Peripheral perfusion intact, pulses as below   Pulmonary/Chest Good respiratory effort, symmetric  chest expansion, no respiratory difficulty appreciated   Neurological Alert and oriented to person, place, and time   Psychiatric Normal mood and affect, behavior and judgment   Skin No rashes or lesions except as mentioned below, no masses   Eyes EOMI, Sclera anicteric   Abdomen Soft, nontender, nondistended     Body mass index is 28.88 kg/m².    VITALS:    Visit Vitals  BP (!) 149/78   Pulse 62   Temp 36.4 °C (97.5 °F) (Oral)   Resp 20   Wt 91.3 kg (201 lb 4.5 oz)   SpO2 97%   BMI 28.88 kg/m²   Smoking Status Never   BSA 2.12 m²        FOCUSED MUSCULOSKELETAL EXAM:     Clavicles non-tender to palpation bilaterally without crepitus  Pelvis stable to AP and lateral compression    RIGHT UPPER EXTREMITY  Inspection:  Significant swelling and tenderness to palpation about the right shoulder.  Prior incision clean and without surrounding erythema.  No open wounds.  Range of motion:  Deferred due to injury  Motor: Motor intact ER/IR, Deltoid, Biceps, Triceps, Wrist flexion,  Wrist extension, Finger flexion,  Finger extension,  Finger abduction,  EPL,  FPL  Sensation: Sensation intact to light touch in axillary, radial, median, and ulnar nerve distributions  Vascular: 2+ radial pulse, capillary refill <2 seconds, digits warm and well perfused    LEFT UPPER EXTREMITY  Inspection: skin intact, prior incision well healed, no deformity, soft compartments, no pain with passive stretch of digits, non-tender to palpation  Range of motion: Full/painless/stable at shoulder, elbow, and wrist  Motor: Motor intact ER/IR, Deltoid, Biceps, Triceps, Wrist flexion,  Wrist extension, Finger flexion,  Finger extension,  Finger abduction,  EPL,  FPL  Sensation: Sensation intact to light touch in axillary, radial, median, and ulnar nerve distributions  Vascular: 2+ radial pulse, capillary refill <2 seconds, digits warm and well perfused               RIGHT LOWER  EXTREMITY  Inspection: skin intact, no deformity, soft compartments, no pain  with passive stretch of digits, non-tender to palpation  Range of motion: Full/painless/stable at hip, knee, and ankle  Motor: Motor intact HAbd,  HF,  KE,  KF,  TA,  GSC,  EHL, FHL  Sensation: Sensation intact to light touch in superficial and deep peroneal, saphenous, sural, and tibial nerve distributions  Vascular: 2+ dorsalis pedis and posterior tibialis pulses, capillary refill <2 seconds, digits warm and well perfused    LEFT LOWER  EXTREMITY  Inspection: skin intact, no deformity, soft compartments, no pain with passive stretch of digits, non-tender to palpation  Range of motion: Full/painless/stable at hip, knee, and ankle  Motor: Motor intact HAbd,  HF,  KE,  KF,  TA,  GSC,  EHL, FHL  Sensation: Sensation intact to light touch in superficial and deep peroneal, saphenous, sural, and tibial nerve distributions  Vascular: 2+ dorsalis pedis and posterior tibialis pulses, capillary refill <2 seconds, digits warm and well perfused        IMAGING        X-rays personally reviewed and show a periprosthetic proximal humerus fracture of the right shoulder    ASSESSMENT AND PLAN      Anurag Gore is a 85 y.o. male patient with right periprosthetic proximal humerus fracture    -patient will need outpatient surgery for this injury  -placed into a cuff and collar  -anticipate follow up in clinic in 1-2 weeks to discuss surgical options  -pain control per ED    Guy Arguelles MD  PGY-3, Orthopaedic Surgery  [facility] [place] [facility]  Orthopaedic Trauma Service Pager: [phone]  Orthopaedic Recon/Spine/Foot and Ankle Service Pager: [phone]           [1]  Past Medical History:  Diagnosis Date   • COPD (chronic obstructive pulmonary disease) (CMS/[facility]) 11/09/2023    Resume home medications   Pulmonary hygiene, IS, PEP, nebs    • Diabetes mellitus, type 2 (CMS/[facility]) 11/10/2023    Hold home meds  SSI coverage while inpatient  FSBG per protocol  CC2 diet  Adjust insulin as needed   • Diverticulosis 11/09/2023    Noted on imaging   Follow up with PCP as needed   • HLD (hyperlipidemia) 11/09/2023   • HTN (hypertension) 11/09/2023   • Lactic acidosis 05/06/2024    IVF resuscitation and recheck as necessary   • Multilevel degenerative disc disease 11/09/2023    Cervical, thoracic, and lumbar spine with degenerative disc disease, spondylosis and degenerative arthropathy noted on imaging  Follow up with PCP as needed   • Umbilical hernia 11/09/2023    Fat containing  Noted on imaging  Follow up with PCP as needed   • Urinary retention 05/06/2024    Started on flomax   5/7: FC removed   [2]  No current facility-administered medications for this encounter.    Current Outpatient Medications:   •  albuterol 108 (90 Base) MCG/ACT inhaler, Inhale 2 puffs 4 (four) times a day if needed., Disp: , Rfl:   •  aspirin 81 MG EC tablet, Take 1 tablet (81 mg) by mouth 1 (one) time each day., Disp: , Rfl:   •  atorvastatin (Lipitor) 20 MG tablet, Take 1 tablet (20 mg) by mouth 1 (one) time each day., Disp: , Rfl:   •  doxepin (SINEquan) 10 MG capsule, , Disp: , Rfl:   •  empagliflozin (Jardiance) 25 MG, Take 1 tablet (25 mg) by mouth 1 (one) time each day., Disp: , Rfl:   •  Fluticasone Furoate-Vilanterol (Breo Ellipta) 100-25 MCG/ACT aerosol powder , Inhale 1 puff 1 (one) time each day., Disp: , Rfl:   •  gabapentin (Neurontin) 300 MG capsule, Take 1 capsule (300 mg) by mouth 3 (three) times a day for 14 days. (Patient not taking: Reported on 7/2/2024), Disp: 42 capsule, Rfl: 0  •  glyBURIDE (Diabeta) 5 MG tablet, Take 2 tablets (10 mg) by mouth 2 (two) times a day with meals., Disp: , Rfl:   •  ibuprofen 400 MG tablet, , Disp: , Rfl:   •  lisinopril 20 MG tablet, Take 1 tablet (20 mg) by mouth 2 (two) times a day., Disp: , Rfl:   •  metFORMIN (Glucophage) 500 MG tablet, Take 2 tablets (1,000 mg) by mouth 2 (two) times a day with meals., Disp: , Rfl:   •  montelukast (Singulair) 10 MG tablet, Take 1 tablet (10 mg) by mouth every night., Disp: , Rfl:   •   multivitamin-minerals-folic acid-coenzyme q10 (Preservision AREDS 2) capsule, Take 1 capsule by mouth 1 (one) time each day., Disp: , Rfl:   •  oxyCODONE (Roxicodone) 5 MG immediate release tablet, Take 1 tablet by mouth every 6 hours as needed (pain) for up to 3 days., Disp: 12 tablet, Rfl: 0  •  pioglitazone (Actos) 30 MG tablet, Take 1 tablet (30 mg) by mouth 1 (one) time each day., Disp: , Rfl:   •  tamsulosin (Flomax) 0.4 MG 24 hr capsule, Take 1 capsule (0.4 mg) by mouth 1 (one) time each day., Disp: , Rfl:   •  tolterodine (Detrol) 1 MG tablet, Take 1 tablet (1 mg) by mouth 2 (two) times a day., Disp: , Rfl:   [3]  No Known Allergies  [4]  Past Surgical History:  Procedure Laterality Date   • FEMUR SURGERY Right    • KNEE SURGERY Left    • KNEE SURGERY Right    • SHOULDER SURGERY Left    • SHOULDER SURGERY Right

## 2025-05-22 ENCOUNTER — HOSPITAL ENCOUNTER (INPATIENT)
Facility: HOSPITAL | Age: 85
LOS: 1 days | Discharge: HOME | DRG: 483 | End: 2025-05-23
Attending: ORTHOPAEDIC SURGERY | Admitting: ORTHOPAEDIC SURGERY
Payer: MEDICARE

## 2025-05-22 ENCOUNTER — APPOINTMENT (OUTPATIENT)
Dept: GENERAL RADIOLOGY | Facility: HOSPITAL | Age: 85
DRG: 483 | End: 2025-05-22
Payer: MEDICARE

## 2025-05-22 ENCOUNTER — ANESTHESIA (OUTPATIENT)
Dept: PERIOP | Facility: HOSPITAL | Age: 85
DRG: 483 | End: 2025-05-22
Payer: MEDICARE

## 2025-05-22 ENCOUNTER — ANESTHESIA EVENT CONVERTED (OUTPATIENT)
Dept: ANESTHESIOLOGY | Facility: HOSPITAL | Age: 85
DRG: 483 | End: 2025-05-22
Payer: MEDICARE

## 2025-05-22 ENCOUNTER — ANESTHESIA EVENT (OUTPATIENT)
Dept: PERIOP | Facility: HOSPITAL | Age: 85
DRG: 483 | End: 2025-05-22
Payer: MEDICARE

## 2025-05-22 DIAGNOSIS — Z96.611 STATUS POST REVERSE TOTAL REPLACEMENT OF RIGHT SHOULDER: Primary | ICD-10-CM

## 2025-05-22 PROBLEM — S42.209A PROXIMAL HUMERAL FRACTURE: Status: ACTIVE | Noted: 2025-05-22

## 2025-05-22 PROBLEM — E11.9 DM2 (DIABETES MELLITUS, TYPE 2): Status: ACTIVE | Noted: 2025-05-22

## 2025-05-22 LAB
GLUCOSE BLDC GLUCOMTR-MCNC: 107 MG/DL (ref 70–130)
GLUCOSE BLDC GLUCOMTR-MCNC: 164 MG/DL (ref 70–130)

## 2025-05-22 PROCEDURE — 25010000002 LIDOCAINE PF 1% 1 % SOLUTION

## 2025-05-22 PROCEDURE — C1713 ANCHOR/SCREW BN/BN,TIS/BN: HCPCS | Performed by: ORTHOPAEDIC SURGERY

## 2025-05-22 PROCEDURE — 25010000002 FENTANYL CITRATE (PF) 50 MCG/ML SOLUTION: Performed by: NURSE ANESTHETIST, CERTIFIED REGISTERED

## 2025-05-22 PROCEDURE — 25810000003 SODIUM CHLORIDE 0.9 % SOLUTION 250 ML FLEX CONT

## 2025-05-22 PROCEDURE — 25810000003 LACTATED RINGERS PER 1000 ML: Performed by: ANESTHESIOLOGY

## 2025-05-22 PROCEDURE — 25010000002 BUPIVACAINE (PF) 0.25 % SOLUTION: Performed by: NURSE ANESTHETIST, CERTIFIED REGISTERED

## 2025-05-22 PROCEDURE — L3670 SO ACRO/CLAV CAN WEB PRE OTS: HCPCS | Performed by: ORTHOPAEDIC SURGERY

## 2025-05-22 PROCEDURE — 25010000002 SUGAMMADEX 200 MG/2ML SOLUTION

## 2025-05-22 PROCEDURE — 25010000002 DEXAMETHASONE PER 1 MG

## 2025-05-22 PROCEDURE — 94640 AIRWAY INHALATION TREATMENT: CPT

## 2025-05-22 PROCEDURE — 25010000002 PHENYLEPHRINE 10 MG/ML SOLUTION 1 ML VIAL

## 2025-05-22 PROCEDURE — 94799 UNLISTED PULMONARY SVC/PX: CPT

## 2025-05-22 PROCEDURE — 73020 X-RAY EXAM OF SHOULDER: CPT

## 2025-05-22 PROCEDURE — 0RPJ0JZ REMOVAL OF SYNTHETIC SUBSTITUTE FROM RIGHT SHOULDER JOINT, OPEN APPROACH: ICD-10-PCS | Performed by: ORTHOPAEDIC SURGERY

## 2025-05-22 PROCEDURE — 82948 REAGENT STRIP/BLOOD GLUCOSE: CPT

## 2025-05-22 PROCEDURE — 25010000002 VANCOMYCIN 1 G RECONSTITUTED SOLUTION: Performed by: ORTHOPAEDIC SURGERY

## 2025-05-22 PROCEDURE — C1776 JOINT DEVICE (IMPLANTABLE): HCPCS | Performed by: ORTHOPAEDIC SURGERY

## 2025-05-22 PROCEDURE — 25010000002 GLYCOPYRROLATE 1 MG/5ML SOLUTION

## 2025-05-22 PROCEDURE — 25010000002 PROPOFOL 10 MG/ML EMULSION

## 2025-05-22 PROCEDURE — 25010000002 HYDROMORPHONE 1 MG/ML SOLUTION

## 2025-05-22 PROCEDURE — 25010000002 ROPIVACAINE HCL-NACL 0.2-0.9 % SOLUTION: Performed by: NURSE ANESTHETIST, CERTIFIED REGISTERED

## 2025-05-22 PROCEDURE — 0RRJ00Z REPLACEMENT OF RIGHT SHOULDER JOINT WITH REVERSE BALL AND SOCKET SYNTHETIC SUBSTITUTE, OPEN APPROACH: ICD-10-PCS | Performed by: ORTHOPAEDIC SURGERY

## 2025-05-22 PROCEDURE — 25010000002 CEFAZOLIN PER 500 MG: Performed by: ORTHOPAEDIC SURGERY

## 2025-05-22 PROCEDURE — 25010000002 ONDANSETRON PER 1 MG

## 2025-05-22 PROCEDURE — C1755 CATHETER, INTRASPINAL: HCPCS | Performed by: ORTHOPAEDIC SURGERY

## 2025-05-22 PROCEDURE — 25010000002 DEXAMETHASONE SODIUM PHOSPHATE 10 MG/ML SOLUTION: Performed by: NURSE ANESTHETIST, CERTIFIED REGISTERED

## 2025-05-22 DEVICE — 42 +4 LAT/24 GLENOSPHERE
Type: IMPLANTABLE DEVICE | Site: SHOULDER | Status: FUNCTIONAL
Brand: ARTHREX®

## 2025-05-22 DEVICE — HUMERAL LINER, CONSTRAINED
Type: IMPLANTABLE DEVICE | Site: SHOULDER | Status: FUNCTIONAL
Brand: EQUINOXE®

## 2025-05-22 DEVICE — IMPLANTABLE DEVICE
Type: IMPLANTABLE DEVICE | Site: SHOULDER | Status: FUNCTIONAL
Brand: EQUINOXE

## 2025-05-22 DEVICE — BONE PREPARATION KIT
Type: IMPLANTABLE DEVICE | Site: SHOULDER | Status: FUNCTIONAL
Brand: BIOPREP

## 2025-05-22 DEVICE — CMT BONE SIMPLEX/P TMYCIN FDOS 10PK: Type: IMPLANTABLE DEVICE | Site: SHOULDER | Status: FUNCTIONAL

## 2025-05-22 RX ORDER — IBUPROFEN 600 MG/1
1 TABLET ORAL
Status: DISCONTINUED | OUTPATIENT
Start: 2025-05-22 | End: 2025-05-23 | Stop reason: HOSPADM

## 2025-05-22 RX ORDER — EPHEDRINE SULFATE 50 MG/ML
INJECTION INTRAVENOUS AS NEEDED
Status: DISCONTINUED | OUTPATIENT
Start: 2025-05-22 | End: 2025-05-22 | Stop reason: SURG

## 2025-05-22 RX ORDER — FENTANYL CITRATE 50 UG/ML
50 INJECTION, SOLUTION INTRAMUSCULAR; INTRAVENOUS
Status: DISCONTINUED | OUTPATIENT
Start: 2025-05-22 | End: 2025-05-22 | Stop reason: HOSPADM

## 2025-05-22 RX ORDER — LISINOPRIL 20 MG/1
20 TABLET ORAL 2 TIMES DAILY
Status: DISCONTINUED | OUTPATIENT
Start: 2025-05-22 | End: 2025-05-23 | Stop reason: HOSPADM

## 2025-05-22 RX ORDER — ACETAMINOPHEN 500 MG
1000 TABLET ORAL ONCE
Status: COMPLETED | OUTPATIENT
Start: 2025-05-22 | End: 2025-05-22

## 2025-05-22 RX ORDER — ROPIVACAINE HYDROCHLORIDE 2 MG/ML
INJECTION, SOLUTION EPIDURAL; INFILTRATION; PERINEURAL CONTINUOUS
Status: DISCONTINUED | OUTPATIENT
Start: 2025-05-22 | End: 2025-05-23 | Stop reason: HOSPADM

## 2025-05-22 RX ORDER — GLYCOPYRROLATE 0.2 MG/ML
INJECTION INTRAMUSCULAR; INTRAVENOUS AS NEEDED
Status: DISCONTINUED | OUTPATIENT
Start: 2025-05-22 | End: 2025-05-22 | Stop reason: SURG

## 2025-05-22 RX ORDER — ONDANSETRON 2 MG/ML
4 INJECTION INTRAMUSCULAR; INTRAVENOUS ONCE AS NEEDED
Status: DISCONTINUED | OUTPATIENT
Start: 2025-05-22 | End: 2025-05-22 | Stop reason: HOSPADM

## 2025-05-22 RX ORDER — ACETAMINOPHEN 650 MG/1
650 SUPPOSITORY RECTAL EVERY 4 HOURS PRN
Status: DISCONTINUED | OUTPATIENT
Start: 2025-05-22 | End: 2025-05-23 | Stop reason: HOSPADM

## 2025-05-22 RX ORDER — VANCOMYCIN HYDROCHLORIDE 1 G/20ML
INJECTION, POWDER, LYOPHILIZED, FOR SOLUTION INTRAVENOUS AS NEEDED
Status: DISCONTINUED | OUTPATIENT
Start: 2025-05-22 | End: 2025-05-22 | Stop reason: HOSPADM

## 2025-05-22 RX ORDER — DEXTROSE MONOHYDRATE 25 G/50ML
25 INJECTION, SOLUTION INTRAVENOUS
Status: DISCONTINUED | OUTPATIENT
Start: 2025-05-22 | End: 2025-05-23 | Stop reason: HOSPADM

## 2025-05-22 RX ORDER — ONDANSETRON 2 MG/ML
INJECTION INTRAMUSCULAR; INTRAVENOUS AS NEEDED
Status: DISCONTINUED | OUTPATIENT
Start: 2025-05-22 | End: 2025-05-22 | Stop reason: SURG

## 2025-05-22 RX ORDER — LIDOCAINE HYDROCHLORIDE 10 MG/ML
0.5 INJECTION, SOLUTION EPIDURAL; INFILTRATION; INTRACAUDAL; PERINEURAL ONCE AS NEEDED
Status: DISCONTINUED | OUTPATIENT
Start: 2025-05-22 | End: 2025-05-22 | Stop reason: HOSPADM

## 2025-05-22 RX ORDER — FENTANYL CITRATE 50 UG/ML
INJECTION, SOLUTION INTRAMUSCULAR; INTRAVENOUS
Status: COMPLETED | OUTPATIENT
Start: 2025-05-22 | End: 2025-05-22

## 2025-05-22 RX ORDER — OXYCODONE HYDROCHLORIDE 5 MG/1
5 TABLET ORAL EVERY 4 HOURS PRN
Status: DISCONTINUED | OUTPATIENT
Start: 2025-05-22 | End: 2025-05-23 | Stop reason: HOSPADM

## 2025-05-22 RX ORDER — HYDROMORPHONE HYDROCHLORIDE 1 MG/ML
0.5 INJECTION, SOLUTION INTRAMUSCULAR; INTRAVENOUS; SUBCUTANEOUS
Status: DISCONTINUED | OUTPATIENT
Start: 2025-05-22 | End: 2025-05-22 | Stop reason: HOSPADM

## 2025-05-22 RX ORDER — FAMOTIDINE 10 MG/ML
20 INJECTION, SOLUTION INTRAVENOUS ONCE
Status: DISCONTINUED | OUTPATIENT
Start: 2025-05-22 | End: 2025-05-22 | Stop reason: HOSPADM

## 2025-05-22 RX ORDER — SODIUM CHLORIDE 0.9 % (FLUSH) 0.9 %
10 SYRINGE (ML) INJECTION EVERY 12 HOURS SCHEDULED
Status: DISCONTINUED | OUTPATIENT
Start: 2025-05-22 | End: 2025-05-22 | Stop reason: HOSPADM

## 2025-05-22 RX ORDER — SODIUM CHLORIDE, SODIUM LACTATE, POTASSIUM CHLORIDE, CALCIUM CHLORIDE 600; 310; 30; 20 MG/100ML; MG/100ML; MG/100ML; MG/100ML
9 INJECTION, SOLUTION INTRAVENOUS CONTINUOUS
Status: DISCONTINUED | OUTPATIENT
Start: 2025-05-23 | End: 2025-05-22

## 2025-05-22 RX ORDER — ACETAMINOPHEN 325 MG/1
650 TABLET ORAL EVERY 4 HOURS PRN
Status: DISCONTINUED | OUTPATIENT
Start: 2025-05-22 | End: 2025-05-23 | Stop reason: HOSPADM

## 2025-05-22 RX ORDER — LIDOCAINE HYDROCHLORIDE 10 MG/ML
INJECTION, SOLUTION EPIDURAL; INFILTRATION; INTRACAUDAL; PERINEURAL AS NEEDED
Status: DISCONTINUED | OUTPATIENT
Start: 2025-05-22 | End: 2025-05-22 | Stop reason: SURG

## 2025-05-22 RX ORDER — SODIUM CHLORIDE 0.9 % (FLUSH) 0.9 %
10 SYRINGE (ML) INJECTION AS NEEDED
Status: DISCONTINUED | OUTPATIENT
Start: 2025-05-22 | End: 2025-05-22 | Stop reason: HOSPADM

## 2025-05-22 RX ORDER — NICOTINE POLACRILEX 4 MG
15 LOZENGE BUCCAL
Status: DISCONTINUED | OUTPATIENT
Start: 2025-05-22 | End: 2025-05-23 | Stop reason: HOSPADM

## 2025-05-22 RX ORDER — ONDANSETRON 4 MG/1
4 TABLET, ORALLY DISINTEGRATING ORAL EVERY 6 HOURS PRN
Status: DISCONTINUED | OUTPATIENT
Start: 2025-05-22 | End: 2025-05-23 | Stop reason: HOSPADM

## 2025-05-22 RX ORDER — ONDANSETRON 2 MG/ML
4 INJECTION INTRAMUSCULAR; INTRAVENOUS EVERY 6 HOURS PRN
Status: DISCONTINUED | OUTPATIENT
Start: 2025-05-22 | End: 2025-05-23 | Stop reason: HOSPADM

## 2025-05-22 RX ORDER — INSULIN LISPRO 100 [IU]/ML
2-7 INJECTION, SOLUTION INTRAVENOUS; SUBCUTANEOUS
Status: DISCONTINUED | OUTPATIENT
Start: 2025-05-22 | End: 2025-05-23 | Stop reason: HOSPADM

## 2025-05-22 RX ORDER — NALOXONE HCL 0.4 MG/ML
0.1 VIAL (ML) INJECTION
Status: DISCONTINUED | OUTPATIENT
Start: 2025-05-22 | End: 2025-05-23 | Stop reason: HOSPADM

## 2025-05-22 RX ORDER — ROCURONIUM BROMIDE 10 MG/ML
INJECTION, SOLUTION INTRAVENOUS AS NEEDED
Status: DISCONTINUED | OUTPATIENT
Start: 2025-05-22 | End: 2025-05-22 | Stop reason: SURG

## 2025-05-22 RX ORDER — BUPIVACAINE HYDROCHLORIDE 2.5 MG/ML
INJECTION, SOLUTION EPIDURAL; INFILTRATION; INTRACAUDAL; PERINEURAL
Status: COMPLETED | OUTPATIENT
Start: 2025-05-22 | End: 2025-05-22

## 2025-05-22 RX ORDER — MIDAZOLAM HYDROCHLORIDE 1 MG/ML
0.5 INJECTION, SOLUTION INTRAMUSCULAR; INTRAVENOUS
Status: DISCONTINUED | OUTPATIENT
Start: 2025-05-22 | End: 2025-05-22 | Stop reason: HOSPADM

## 2025-05-22 RX ORDER — TRANEXAMIC ACID 10 MG/ML
1000 INJECTION, SOLUTION INTRAVENOUS ONCE
Status: COMPLETED | OUTPATIENT
Start: 2025-05-22 | End: 2025-05-22

## 2025-05-22 RX ORDER — ATORVASTATIN CALCIUM 10 MG/1
10 TABLET, FILM COATED ORAL DAILY
Status: DISCONTINUED | OUTPATIENT
Start: 2025-05-23 | End: 2025-05-23 | Stop reason: HOSPADM

## 2025-05-22 RX ORDER — TAMSULOSIN HYDROCHLORIDE 0.4 MG/1
0.4 CAPSULE ORAL DAILY
Status: DISCONTINUED | OUTPATIENT
Start: 2025-05-23 | End: 2025-05-23 | Stop reason: HOSPADM

## 2025-05-22 RX ORDER — BUDESONIDE AND FORMOTEROL FUMARATE DIHYDRATE 160; 4.5 UG/1; UG/1
2 AEROSOL RESPIRATORY (INHALATION)
Status: DISCONTINUED | OUTPATIENT
Start: 2025-05-22 | End: 2025-05-23 | Stop reason: HOSPADM

## 2025-05-22 RX ORDER — LABETALOL HYDROCHLORIDE 5 MG/ML
10 INJECTION, SOLUTION INTRAVENOUS EVERY 4 HOURS PRN
Status: DISCONTINUED | OUTPATIENT
Start: 2025-05-22 | End: 2025-05-23 | Stop reason: HOSPADM

## 2025-05-22 RX ORDER — SODIUM CHLORIDE 450 MG/100ML
50 INJECTION, SOLUTION INTRAVENOUS CONTINUOUS
Status: DISCONTINUED | OUTPATIENT
Start: 2025-05-22 | End: 2025-05-23 | Stop reason: HOSPADM

## 2025-05-22 RX ORDER — FAMOTIDINE 20 MG/1
20 TABLET, FILM COATED ORAL ONCE
Status: COMPLETED | OUTPATIENT
Start: 2025-05-22 | End: 2025-05-22

## 2025-05-22 RX ORDER — OXYBUTYNIN CHLORIDE 5 MG/1
5 TABLET, EXTENDED RELEASE ORAL DAILY
Status: DISCONTINUED | OUTPATIENT
Start: 2025-05-23 | End: 2025-05-23 | Stop reason: HOSPADM

## 2025-05-22 RX ORDER — HYDROMORPHONE HYDROCHLORIDE 1 MG/ML
0.5 INJECTION, SOLUTION INTRAMUSCULAR; INTRAVENOUS; SUBCUTANEOUS
Status: DISCONTINUED | OUTPATIENT
Start: 2025-05-22 | End: 2025-05-23 | Stop reason: HOSPADM

## 2025-05-22 RX ORDER — PREGABALIN 75 MG/1
75 CAPSULE ORAL ONCE
Status: COMPLETED | OUTPATIENT
Start: 2025-05-22 | End: 2025-05-22

## 2025-05-22 RX ORDER — DEXAMETHASONE SODIUM PHOSPHATE 4 MG/ML
INJECTION, SOLUTION INTRA-ARTICULAR; INTRALESIONAL; INTRAMUSCULAR; INTRAVENOUS; SOFT TISSUE AS NEEDED
Status: DISCONTINUED | OUTPATIENT
Start: 2025-05-22 | End: 2025-05-22 | Stop reason: SURG

## 2025-05-22 RX ORDER — PROPOFOL 10 MG/ML
VIAL (ML) INTRAVENOUS AS NEEDED
Status: DISCONTINUED | OUTPATIENT
Start: 2025-05-22 | End: 2025-05-22 | Stop reason: SURG

## 2025-05-22 RX ORDER — DOXEPIN HYDROCHLORIDE 10 MG/1
10 CAPSULE ORAL NIGHTLY
Status: DISCONTINUED | OUTPATIENT
Start: 2025-05-22 | End: 2025-05-23 | Stop reason: HOSPADM

## 2025-05-22 RX ORDER — DEXAMETHASONE SODIUM PHOSPHATE 10 MG/ML
INJECTION, SOLUTION INTRAMUSCULAR; INTRAVENOUS
Status: COMPLETED | OUTPATIENT
Start: 2025-05-22 | End: 2025-05-22

## 2025-05-22 RX ADMIN — FENTANYL CITRATE 100 MCG: 50 INJECTION, SOLUTION INTRAMUSCULAR; INTRAVENOUS at 14:04

## 2025-05-22 RX ADMIN — ACETAMINOPHEN 1000 MG: 500 TABLET ORAL at 14:02

## 2025-05-22 RX ADMIN — LIDOCAINE HYDROCHLORIDE 50 MG: 10 INJECTION, SOLUTION EPIDURAL; INFILTRATION; INTRACAUDAL; PERINEURAL at 14:36

## 2025-05-22 RX ADMIN — GLYCOPYRROLATE 0.2 MG: 1 INJECTION INTRAMUSCULAR; INTRAVENOUS at 15:00

## 2025-05-22 RX ADMIN — ROCURONIUM 20 MG: 50 INJECTION, SOLUTION INTRAVENOUS at 15:57

## 2025-05-22 RX ADMIN — EPHEDRINE SULFATE 10 MG: 50 INJECTION INTRAVENOUS at 15:17

## 2025-05-22 RX ADMIN — BUDESONIDE AND FORMOTEROL FUMARATE DIHYDRATE 2 PUFF: 160; 4.5 AEROSOL RESPIRATORY (INHALATION) at 22:13

## 2025-05-22 RX ADMIN — DEXAMETHASONE SODIUM PHOSPHATE 4 MG: 4 INJECTION INTRA-ARTICULAR; INTRALESIONAL; INTRAMUSCULAR; INTRAVENOUS; SOFT TISSUE at 14:45

## 2025-05-22 RX ADMIN — TRANEXAMIC ACID 1000 MG: 10 INJECTION, SOLUTION INTRAVENOUS at 14:43

## 2025-05-22 RX ADMIN — SODIUM CHLORIDE, POTASSIUM CHLORIDE, SODIUM LACTATE AND CALCIUM CHLORIDE: 600; 310; 30; 20 INJECTION, SOLUTION INTRAVENOUS at 16:23

## 2025-05-22 RX ADMIN — HYDROMORPHONE HYDROCHLORIDE 0.5 MG: 1 INJECTION, SOLUTION INTRAMUSCULAR; INTRAVENOUS; SUBCUTANEOUS at 18:28

## 2025-05-22 RX ADMIN — TRANEXAMIC ACID 1000 MG: 10 INJECTION, SOLUTION INTRAVENOUS at 16:47

## 2025-05-22 RX ADMIN — PROPOFOL 50 MG: 10 INJECTION, EMULSION INTRAVENOUS at 14:36

## 2025-05-22 RX ADMIN — ROCURONIUM 60 MG: 50 INJECTION, SOLUTION INTRAVENOUS at 14:36

## 2025-05-22 RX ADMIN — DEXAMETHASONE SODIUM PHOSPHATE 2 MG: 10 INJECTION INTRAMUSCULAR; INTRAVENOUS at 14:17

## 2025-05-22 RX ADMIN — SODIUM CHLORIDE 2000 MG: 900 INJECTION INTRAVENOUS at 21:35

## 2025-05-22 RX ADMIN — BUPIVACAINE HYDROCHLORIDE 30 ML: 2.5 INJECTION, SOLUTION EPIDURAL; INFILTRATION; INTRACAUDAL; PERINEURAL at 14:17

## 2025-05-22 RX ADMIN — Medication 1000 MG: at 18:30

## 2025-05-22 RX ADMIN — ONDANSETRON 4 MG: 2 INJECTION INTRAMUSCULAR; INTRAVENOUS at 16:47

## 2025-05-22 RX ADMIN — EPHEDRINE SULFATE 5 MG: 50 INJECTION INTRAVENOUS at 15:06

## 2025-05-22 RX ADMIN — BUPIVACAINE HYDROCHLORIDE 15 ML: 2.5 INJECTION, SOLUTION EPIDURAL; INFILTRATION; INTRACAUDAL; PERINEURAL at 14:14

## 2025-05-22 RX ADMIN — ROCURONIUM 20 MG: 50 INJECTION, SOLUTION INTRAVENOUS at 15:19

## 2025-05-22 RX ADMIN — FAMOTIDINE 20 MG: 20 TABLET, FILM COATED ORAL at 14:02

## 2025-05-22 RX ADMIN — SODIUM CHLORIDE 50 ML/HR: 0.45 INJECTION, SOLUTION INTRAVENOUS at 21:32

## 2025-05-22 RX ADMIN — PREGABALIN 75 MG: 75 CAPSULE ORAL at 14:02

## 2025-05-22 RX ADMIN — SUGAMMADEX 200 MG: 100 INJECTION, SOLUTION INTRAVENOUS at 17:57

## 2025-05-22 RX ADMIN — PHENYLEPHRINE HYDROCHLORIDE 0.2 MCG/KG/MIN: 10 INJECTION INTRAVENOUS at 14:41

## 2025-05-22 RX ADMIN — SODIUM CHLORIDE 2000 MG: 900 INJECTION INTRAVENOUS at 14:32

## 2025-05-22 RX ADMIN — SODIUM CHLORIDE, POTASSIUM CHLORIDE, SODIUM LACTATE AND CALCIUM CHLORIDE: 600; 310; 30; 20 INJECTION, SOLUTION INTRAVENOUS at 14:30

## 2025-05-22 NOTE — INTERVAL H&P NOTE
Psychiatric Pre-op    Full history and physical note from office is attached.    VS: /75  HR 66  RR 16  T 97.3  Sat 100%RA    Immunizations:  Influenza:  UTD  Pneumococcal:  UTD  Tetanus:  UTD    Review of Systems:  Constitutional-- No fever, chills or sweats. No fatigue.  CV-- No chest pain, palpitation or syncope  Resp-- No SOB, cough, hemoptysis  Skin--No rashes or lesions    Physical Exam:  Heart:   Regular rate and rhythm, S1 and S2 normal  Lungs: Clear to auscultation bilaterally, respirations unlabored    LAB Results:  Lab Results   Component Value Date    WBC 6.78 05/16/2024    HGB 8.7 (L) 05/16/2024    HCT 28.5 (L) 05/16/2024    MCV 91 05/16/2024     05/16/2024    NEUTROABS 4.88 11/09/2023    GLUCOSE 383 (H) 01/11/2022    BUN 11 01/11/2022    CREATININE 1.32 (H) 01/11/2022    EGFRIFNONA 52 (L) 01/11/2022    EGFRIFAFRI 85 11/09/2023     01/11/2022    K 4.2 01/11/2022     01/11/2022    CO2 26.4 01/11/2022    MG 1.8 (L) 05/16/2024    CALCIUM 9.1 01/11/2022    ALBUMIN 4.10 08/04/2020    AST 30 08/04/2020    ALT 24 08/04/2020    BILITOT 0.3 08/04/2020    PTT 28 05/05/2024    INR 1.2 (H) 05/05/2024       Cancer Staging (if applicable)  Cancer Patient: __ yes __no __unknown__N/A; If yes, clinical stage T:__ N:__M:__, stage group or __N/A      Impression: right periprosthetic proximal humerus fracture       Plan: RIGHT REVISION REVERSE TOTAL SHOULDER ARTHROPLASTY, EXPLANTATION OF HUMERAL COMPONENT AND PLACEMENT OF PROXIMAL HUMERAL RECONSTRUTCION PROSTHESIS       Yadira Rosenberg, ROMERO   5/22/2025   13:39 EDT

## 2025-05-22 NOTE — ANESTHESIA PREPROCEDURE EVALUATION
Anesthesia Evaluation                  Airway   Mallampati: I  TM distance: >3 FB  Neck ROM: full  No difficulty expected  Dental      Pulmonary    (+) asthma,shortness of breath  Cardiovascular     ECG reviewed    (+) hypertension, CAD    ROS comment: Ischemic area on stress    Neuro/Psych  GI/Hepatic/Renal/Endo    (+) renal disease-, diabetes mellitus    Musculoskeletal     Abdominal    Substance History      OB/GYN          Other   arthritis,   history of cancer                Anesthesia Plan    ASA 4     general     (Right isnb/c  marked)  intravenous induction     Anesthetic plan, risks, benefits, and alternatives have been provided, discussed and informed consent has been obtained with: patient.    Plan discussed with CRNA.    CODE STATUS:

## 2025-05-22 NOTE — OP NOTE
Preoperative Diagnosis:  severely comminuted right [place] B2 periprosthetic humeral shaft fracture  after right reverse total shoulder arthroplasty     Postoperative Diagnosis: Same     Procedure:      Revision right reverse total shoulder arthroplasty-- humeral  and glenoid components        Surgeon: Dr. Steven Raphael  22 modifier due to the complexity of surgery-- below of total shoulder arthroplasty and due to severe proximal humeral comminution      Assistant: Jose Alfredo Taveras PA-C  ** Please note the physician assistant was medically necessary to assist with positioning retraction, arm positioning, care of soft tissues and closure     EBL:   300 cc     Anesthesia: GETA with interscalene brachial plexus block     Implants:   Arthrex Reverse TSA  42+3 glenosphere       Revision reverse TSA  Exactech Equinoxe humeral reconstruction prosthesis  Stem: 11 x 80mm  Distal Collar: 24.5mm  Middle Segment: 50mm  Proximal Body: Medium +0  Taper Locking Screw: 50mm  Humeral Tray: std +0mm  Humeral Liner: 42mm, 145degrees, +0 constrained                   Indications:Patient  is a 84yo male  who suffered  above injury.  Risks and benefits of operative versus nonoperative management discussed.  Patient elected to proceed with surgery. Informed consent obtained.         Description: Patient was met in the preoperative holding area and confirmed by name, medical record number, . A regional anesthetic was administered.  Patient was then brought to the operating room suite and and placed supine on the OR table.  Patient underwent smooth induction with GETA.  Patient then positioned in the beach chair position. Patient’s right shoulder and  upper extremity prepped and draped in sterile fashion. Preoperative prophylactic antibiotic given with Ancef.  A timeout was then observed     An approx 18cm skin incision was made centered over the deltopectoral interval and extending distally via an extensile Byron approach.  Dissection carried down to the interval taking the cephalic vein laterally.  The reverse total shoulder was exposed     The stem was noted to be grossly loose and was readily removed .  Fragmentation and comminution of the greater and lesser tuberosity and the metaphysis and proximal diaphysis was found.  An attempt was made to use a series of clamps and wires to hold the fracture together provisionally, however, the fragments were so comminuted that fixation could not be achieved.  As such a proximal humeral reconstruction prosthesis was elected        We then turned our attention to our revision reverse total shoulder.  We excised the comminuted fragments and planed the diaphyseal portion of the humerus down to a flat surface.  We reamed the canal and placed our 26.5mm collar trial.  We then placed our stem trial , 80 x 11mm     We then assembled our 24.5mm collar to our 80 x 11mm stem and cemented our  distal stem in position.  Cement then cured     Proximally, the glenosphere had noted severe third body wear on the surface.  We extracted our old glenosphere, we checked the stability of the baseplate and it was noted to be well fixed.   We then placed a new Arthrex 42+3 glenosphere.       We then trialed our proximal humeral components to achieve the right stability and tension.  We then redislocated the shoulder and irrigated with sterile saline and placed our final proximal humeral components.  The shoulder was reduced and C arm images noted excellent position and stability.   We then ranged the shoulder to all positions of risk and no instability was noted     We then irrigated with sterile saline and Irrisept. Vancomycin powder wasplaced in the wound.We closed the fascial layer of the interval with a running locking 0 vicryl and added interfyl to the interval.  We then closed the dermal layer with 2-0 vicryl and the skin with interrupted 3-0 nylon.  A sterile suction dressing was applied     Patient tolerated  the procedure well.  He was extubated and placed in a sling.  At the end of the case all sponge and instrument counts were correct x 2.       Plan:  Patient will be admitted for postoperative monitoring. They will remain in the sling until she is seen back in clinic at approx 10 days postop. They can come out of the sling for elbow wrist and hand ROM.     strict elbow wrist and hand ROM only until 4 weeks postop

## 2025-05-22 NOTE — ANESTHESIA PROCEDURE NOTES
Peripheral Block      Patient reassessed immediately prior to procedure    Patient location during procedure: pre-op  Start time: 5/22/2025 2:04 PM  Stop time: 5/22/2025 2:14 PM  Reason for block: at surgeon's request and post-op pain management  Performed by  CRNA/CAA: Elian Tovar CRNA  Assisted by: Nara Flood RN  Preanesthetic Checklist  Completed: patient identified, IV checked, site marked, risks and benefits discussed, surgical consent, monitors and equipment checked, pre-op evaluation and timeout performed  Prep:  Pt Position: left lateral decubitus  Sterile barriers:cap, gloves, mask and washed/disinfected hands  Prep: ChloraPrep  Patient monitoring: blood pressure monitoring, continuous pulse oximetry and EKG  Procedure    Sedation: yes  Performed under: local infiltration  Guidance:ultrasound guided    ULTRASOUND INTERPRETATION.  Using ultrasound guidance a 20 G gauge needle was placed in close proximity to the nerve, at which point, under ultrasound guidance anesthetic was injected in the area of the nerve and spread of the anesthesia was seen on ultrasound in close proximity thereto.  There were no abnormalities seen on ultrasound; a digital image was taken; and the patient tolerated the procedure with no complications. Images:still images obtained, printed/placed on chart    Laterality:right  Block Type:interscalene  Injection Technique:catheter  Needle Type:Tuohy and echogenic  Needle Gauge:18 G  Resistance on Injection: none  Catheter Size:20 G (20g)  Cath Depth at skin: 9 cm    Medications Used: fentaNYL citrate (PF) (SUBLIMAZE) injection - Intravenous   100 mcg - 5/22/2025 2:04:00 PM  bupivacaine PF (MARCAINE) 0.25 % injection - Injection   15 mL - 5/22/2025 2:14:00 PM      Medications  Preservative Free Saline:5ml    Post Assessment  Injection Assessment: negative aspiration for heme, no paresthesia on injection and incremental injection  Patient Tolerance:comfortable throughout  "block  Complications:no  Additional Notes  CATHETER  A high-frequency linear transducer, with sterile cover, was placed in the supraclavicular fossa to identify the subclavian artery and trunks and divisions of the brachial plexus. The transducer was then moved in a cephalad orientation with a slight rotation to continue visualization of the brachial plexus from the trunks and divisions, on to the C5-C7 roots. The insertion site was prepped and draped in sterile fashion. Skin and cutaneous tissue was infiltrated with 2-5 ml of 1% Lidocaine. Using ultrasound-guidance, an 18-gauge Contiplex Ultra 360 Touhy needle was advanced in plane from lateral to medial. Preservative-free normal saline was utilized for hydro-dissection of tissue, advancement of Touhy, and to confirm final needle placement at the fascial plane between the middle scalene muscle and sheath of the brachial plexus (C5-C7). A 20-gauge Contiplex Echo catheter was placed through the needle and advance out the tip of the Touhy 3-5 cm with the \"Multani Flip\". The Touhy needle was then removed, and final catheter position verified lateral to the brachial plexus with local anesthetic (LA) and ultrasound visualization. The catheter was secured in the usual fashion with skin glue, benzoin, steri-strips, CHG tegaderm and label noting \"Nerve Block Catheter\". Jerk tape applied at yellow connector and catheter connection. All LA was injected in increments of 3-5 ml after catheter secured. Aspiration every 5 ml to prevent intravascular injection. Injection was completed with negative aspiration of blood and negative intravascular injection. Injection pressures were normal with minimal resistance.   Performed by: Elian Tovar, CRNA            "

## 2025-05-22 NOTE — ANESTHESIA PROCEDURE NOTES
Airway  Reason: elective    Date/Time: 5/22/2025 2:38 PM  Airway not difficult    General Information and Staff    Patient location during procedure: OR  CRNA/CAA: Viridiana Smith CRNA    Indications and Patient Condition  Indications for airway management: airway protection    Preoxygenated: yes  MILS not maintained throughout    Mask difficulty assessment: 1 - vent by mask    Final Airway Details    Final airway type: endotracheal airway      Successful airway: ETT  Cuffed: yes   Successful intubation technique: video laryngoscopy  Endotracheal tube insertion site: oral  Blade: Smith  Blade size: 4  ETT size (mm): 7.5  Cormack-Lehane Classification: grade I - full view of glottis  Placement verified by: chest auscultation and capnometry   Cuff volume (mL): 8  Measured from: lips  ETT/EBT  to lips (cm): 22  Number of attempts at approach: 1  Assessment: lips, teeth, and gum same as pre-op and atraumatic intubation    Additional Comments  Negative epigastric sounds, Breath sound equal bilaterally with symmetric chest rise and fall

## 2025-05-22 NOTE — ANESTHESIA POSTPROCEDURE EVALUATION
Patient: Scotty Gore    Procedure Summary       Date: 05/22/25 Room / Location:  [facility]    Anesthesia Start: 1430 Anesthesia Stop: 1808    Procedure: RIGHT REVISION REVERSE TOTAL SHOULDER ARTHROPLASTY, EXPLANTATION OF HUMERAL COMPONENT AND PLACEMENT OF PROXIMAL HUMERAL RECONSTRUTCION PROSTHESIS (Right: Shoulder) Diagnosis:       Proximal humeral fracture      (periprosthetic humerus fracture)    Surgeons: Steven Raphael MD Provider: Mickey Sánchez Jr., MD    Anesthesia Type: general ASA Status: 4            Anesthesia Type: general    Vitals  Vitals Value Taken Time   /60 05/22/25 18:08   Temp 97.2 °F (36.2 °C) 05/22/25 18:08   Pulse 65 05/22/25 18:08   Resp 24 05/22/25 18:08   SpO2 98 % 05/22/25 18:08           Post Anesthesia Care and Evaluation    Patient location during evaluation: PACU  Patient participation: complete - patient participated  Level of consciousness: awake and alert  Pain management: adequate    Airway patency: patent  Anesthetic complications: No anesthetic complications  PONV Status: none  Cardiovascular status: hemodynamically stable and acceptable  Respiratory status: nonlabored ventilation, acceptable, nasal cannula and spontaneous ventilation  Hydration status: acceptable  No anesthesia care post op

## 2025-05-22 NOTE — ANESTHESIA PROCEDURE NOTES
Peripheral Block      Patient reassessed immediately prior to procedure    Patient location during procedure: OR  Start time: 5/22/2025 2:15 PM  Stop time: 5/22/2025 2:17 PM  Reason for block: at surgeon's request and post-op pain management  Performed by  CRNA/CAA: Elian Tovar CRNA  Assisted by: Nara Flood RN  Preanesthetic Checklist  Completed: patient identified, IV checked, site marked, risks and benefits discussed, surgical consent, monitors and equipment checked, pre-op evaluation and timeout performed  Prep:  Pt Position: supine  Sterile barriers:cap, gloves, mask and washed/disinfected hands  Prep: ChloraPrep  Patient monitoring: blood pressure monitoring, continuous pulse oximetry and EKG  Procedure  Performed under: general  Guidance:ultrasound guided and landmark technique  Images:still images obtained, printed/placed on chart    Laterality:right  Block Type:PECS I and PECS II  Injection Technique:single-shot  Needle Type:short-bevel  Needle Gauge:20 G  Resistance on Injection: none    Medications Used: dexamethasone sodium phosphate injection - Injection   2 mg - 5/22/2025 2:17:00 PM  bupivacaine PF (MARCAINE) 0.25 % injection - Injection   30 mL - 5/22/2025 2:17:00 PM      Medications  Preservative Free Saline:5ml    Post Assessment  Injection Assessment: negative aspiration for heme, incremental injection and no paresthesia on injection  Patient Tolerance:comfortable throughout block  Complications:no  Additional Notes  Interpectoral-Pectoserratus Plane   A high-frequency linear transducer, with sterile cover, was placed medial to the coracoid process in the paramedian sagittal plane. The transducer was moved caudally to the 4th rib and rotated slightly to allow an in-plane needle trajectory from medial to lateral. Pectoralis Major Muscle (PMM), Pectoralis Minor Muscle (PmM), Thoracoacromial Artery, Ribs, and Pleura were identified under ultrasound. The insertion site was prepped in  "sterile fashion and then localized with 2-5 ml of 1% Lidocaine. Using ultrasound-guidance, a 20-gauge B-Valencia 4\" Ultraplex 360 non-stimulating echogenic needle was advanced in plane until the tip of the needle was in the fascial plane between the PMM and PmM, lateral to the Thoracoacromial Artery. 1-3ml of preservative free normal saline was used to hydro-dissect the fascial planes. After the fascial plane was verified, 10ml local anesthetic (LA) was injected for Interpectoral fascial plane block. The needle was continued along the same path to the level of the 4th rib below PmM.  Initially preservative free normal saline was used to confirm needle position and then 20 ml of LA was injected for Pectoserratus fascial plane block. Aspiration every 5 ml to prevent intravascular injection. Injection was completed with negative aspiration of blood and negative intravascular injection. Injection pressures were normal with minimal resistance.     Performed by: Elian Tovar, ERICK            "

## 2025-05-23 ENCOUNTER — READMISSION MANAGEMENT (OUTPATIENT)
Dept: CALL CENTER | Facility: HOSPITAL | Age: 85
End: 2025-05-23
Payer: MEDICARE

## 2025-05-23 VITALS
RESPIRATION RATE: 18 BRPM | DIASTOLIC BLOOD PRESSURE: 61 MMHG | HEART RATE: 61 BPM | SYSTOLIC BLOOD PRESSURE: 120 MMHG | OXYGEN SATURATION: 97 % | HEIGHT: 70 IN | TEMPERATURE: 98.3 F | WEIGHT: 217.3 LBS | BODY MASS INDEX: 31.11 KG/M2

## 2025-05-23 LAB
ANION GAP SERPL CALCULATED.3IONS-SCNC: 16 MMOL/L (ref 5–15)
BASOPHILS # BLD AUTO: 0.05 10*3/MM3 (ref 0–0.2)
BASOPHILS NFR BLD AUTO: 0.6 % (ref 0–1.5)
BUN SERPL-MCNC: 28 MG/DL (ref 8–23)
BUN/CREAT SERPL: 21.2 (ref 7–25)
CALCIUM SPEC-SCNC: 8.8 MG/DL (ref 8.6–10.5)
CHLORIDE SERPL-SCNC: 102 MMOL/L (ref 98–107)
CO2 SERPL-SCNC: 18 MMOL/L (ref 22–29)
CREAT SERPL-MCNC: 1.32 MG/DL (ref 0.76–1.27)
DEPRECATED RDW RBC AUTO: 54.5 FL (ref 37–54)
EGFRCR SERPLBLD CKD-EPI 2021: 52.9 ML/MIN/1.73
EOSINOPHIL # BLD AUTO: 0.06 10*3/MM3 (ref 0–0.4)
EOSINOPHIL NFR BLD AUTO: 0.7 % (ref 0.3–6.2)
ERYTHROCYTE [DISTWIDTH] IN BLOOD BY AUTOMATED COUNT: 16.9 % (ref 12.3–15.4)
GLUCOSE BLDC GLUCOMTR-MCNC: 182 MG/DL (ref 70–130)
GLUCOSE BLDC GLUCOMTR-MCNC: 185 MG/DL (ref 70–130)
GLUCOSE SERPL-MCNC: 194 MG/DL (ref 65–99)
HCT VFR BLD AUTO: 30.7 % (ref 37.5–51)
HGB BLD-MCNC: 9.4 G/DL (ref 13–17.7)
IMM GRANULOCYTES # BLD AUTO: 0.05 10*3/MM3 (ref 0–0.05)
IMM GRANULOCYTES NFR BLD AUTO: 0.6 % (ref 0–0.5)
LYMPHOCYTES # BLD AUTO: 0.87 10*3/MM3 (ref 0.7–3.1)
LYMPHOCYTES NFR BLD AUTO: 9.7 % (ref 19.6–45.3)
MCH RBC QN AUTO: 27.3 PG (ref 26.6–33)
MCHC RBC AUTO-ENTMCNC: 30.6 G/DL (ref 31.5–35.7)
MCV RBC AUTO: 89.2 FL (ref 79–97)
MONOCYTES # BLD AUTO: 0.68 10*3/MM3 (ref 0.1–0.9)
MONOCYTES NFR BLD AUTO: 7.6 % (ref 5–12)
NEUTROPHILS NFR BLD AUTO: 7.23 10*3/MM3 (ref 1.7–7)
NEUTROPHILS NFR BLD AUTO: 80.8 % (ref 42.7–76)
NRBC BLD AUTO-RTO: 0 /100 WBC (ref 0–0.2)
PLATELET # BLD AUTO: 335 10*3/MM3 (ref 140–450)
PMV BLD AUTO: 10.3 FL (ref 6–12)
POTASSIUM SERPL-SCNC: 4.9 MMOL/L (ref 3.5–5.2)
RBC # BLD AUTO: 3.44 10*6/MM3 (ref 4.14–5.8)
SODIUM SERPL-SCNC: 136 MMOL/L (ref 136–145)
WBC NRBC COR # BLD AUTO: 8.94 10*3/MM3 (ref 3.4–10.8)

## 2025-05-23 PROCEDURE — 25010000002 CEFAZOLIN PER 500 MG: Performed by: ORTHOPAEDIC SURGERY

## 2025-05-23 PROCEDURE — 82948 REAGENT STRIP/BLOOD GLUCOSE: CPT

## 2025-05-23 PROCEDURE — 97530 THERAPEUTIC ACTIVITIES: CPT

## 2025-05-23 PROCEDURE — 63710000001 INSULIN LISPRO (HUMAN) PER 5 UNITS: Performed by: INTERNAL MEDICINE

## 2025-05-23 PROCEDURE — 94761 N-INVAS EAR/PLS OXIMETRY MLT: CPT

## 2025-05-23 PROCEDURE — 97110 THERAPEUTIC EXERCISES: CPT

## 2025-05-23 PROCEDURE — 80048 BASIC METABOLIC PNL TOTAL CA: CPT | Performed by: ORTHOPAEDIC SURGERY

## 2025-05-23 PROCEDURE — 94664 DEMO&/EVAL PT USE INHALER: CPT

## 2025-05-23 PROCEDURE — 97166 OT EVAL MOD COMPLEX 45 MIN: CPT

## 2025-05-23 PROCEDURE — 97535 SELF CARE MNGMENT TRAINING: CPT

## 2025-05-23 PROCEDURE — 97161 PT EVAL LOW COMPLEX 20 MIN: CPT

## 2025-05-23 PROCEDURE — 94799 UNLISTED PULMONARY SVC/PX: CPT

## 2025-05-23 PROCEDURE — 85025 COMPLETE CBC W/AUTO DIFF WBC: CPT | Performed by: ORTHOPAEDIC SURGERY

## 2025-05-23 RX ORDER — OXYCODONE HYDROCHLORIDE 5 MG/1
5 TABLET ORAL EVERY 4 HOURS PRN
Qty: 30 TABLET | Refills: 0 | Status: SHIPPED | OUTPATIENT
Start: 2025-05-23

## 2025-05-23 RX ORDER — DOCUSATE SODIUM 100 MG/1
100 CAPSULE, LIQUID FILLED ORAL 2 TIMES DAILY
Qty: 30 CAPSULE | Refills: 0 | Status: SHIPPED | OUTPATIENT
Start: 2025-05-23 | End: 2025-06-07

## 2025-05-23 RX ORDER — ROPIVACAINE HYDROCHLORIDE 2 MG/ML
2 INJECTION, SOLUTION EPIDURAL; INFILTRATION; PERINEURAL CONTINUOUS
Start: 2025-05-23

## 2025-05-23 RX ORDER — MULTIVITAMIN WITH IRON
1000 TABLET ORAL DAILY
COMMUNITY

## 2025-05-23 RX ORDER — VIBEGRON 75 MG/1
75 TABLET, FILM COATED ORAL DAILY
COMMUNITY

## 2025-05-23 RX ORDER — GLIMEPIRIDE 4 MG/1
4 TABLET ORAL
COMMUNITY

## 2025-05-23 RX ORDER — OMEGA-3 FATTY ACIDS/FISH OIL 300-1000MG
2 CAPSULE ORAL DAILY
COMMUNITY

## 2025-05-23 RX ORDER — TAMSULOSIN HYDROCHLORIDE 0.4 MG/1
2 CAPSULE ORAL DAILY
Start: 2025-05-23

## 2025-05-23 RX ORDER — ACETAMINOPHEN 500 MG
1000 TABLET ORAL EVERY 8 HOURS
Qty: 60 TABLET | Refills: 0 | Status: SHIPPED | OUTPATIENT
Start: 2025-05-23

## 2025-05-23 RX ADMIN — INSULIN LISPRO 2 UNITS: 100 INJECTION, SOLUTION INTRAVENOUS; SUBCUTANEOUS at 12:59

## 2025-05-23 RX ADMIN — SODIUM CHLORIDE 2000 MG: 900 INJECTION INTRAVENOUS at 07:45

## 2025-05-23 RX ADMIN — EMPAGLIFLOZIN 25 MG: 25 TABLET, FILM COATED ORAL at 08:31

## 2025-05-23 RX ADMIN — INSULIN LISPRO 2 UNITS: 100 INJECTION, SOLUTION INTRAVENOUS; SUBCUTANEOUS at 08:31

## 2025-05-23 RX ADMIN — ATORVASTATIN CALCIUM 10 MG: 10 TABLET, FILM COATED ORAL at 08:31

## 2025-05-23 RX ADMIN — LISINOPRIL 20 MG: 20 TABLET ORAL at 01:10

## 2025-05-23 RX ADMIN — OXYBUTYNIN CHLORIDE 5 MG: 5 TABLET, EXTENDED RELEASE ORAL at 08:31

## 2025-05-23 RX ADMIN — BUDESONIDE AND FORMOTEROL FUMARATE DIHYDRATE 2 PUFF: 160; 4.5 AEROSOL RESPIRATORY (INHALATION) at 06:55

## 2025-05-23 RX ADMIN — LISINOPRIL 20 MG: 20 TABLET ORAL at 08:31

## 2025-05-23 RX ADMIN — TAMSULOSIN HYDROCHLORIDE 0.4 MG: 0.4 CAPSULE ORAL at 08:31

## 2025-05-23 NOTE — PROGRESS NOTES
[facility]    Acute pain service Inpatient Progress Note    Patient Name: Scotty Gore  :  1940  MRN:  [id number]        Acute Pain  Service Inpatient Progress Note:    Analgesia:Excellent  Pain Score:10  LOC: alert and awake  Side Effects:None  Catheter Site:clean, dry and dressing intact  Cath type: peripheral nerve cath(InfuSystem)  Infusion rate: Ext/Pop: Basal: 1ml/hr, PIB: 5ml q 2 h, PCA: 5 ml q 30 min  Catheter Plan:Catheter to remain Insitu and Continue catheter infusion rate unchanged

## 2025-05-23 NOTE — THERAPY EVALUATION
Patient Name: Scotty Gore  : 1940    MRN: [id number]                              Today's Date: 2025       Admit Date: 2025    Visit Dx:     ICD-10-CM ICD-9-CM   1. 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 , FSBS 1 X 3-4 DAYS    High cholesterol     History of kidney stones     History of radiation therapy     for prostate cancer     Hypertension     SOB (shortness of breath)     Wears dentures      Past Surgical History:   Procedure Laterality Date    CARDIAC CATHETERIZATION N/A 2018    Procedure: Left Heart Cath;  Surgeon: Bradley Sandhu III, MD;  Location:  [facility];  Service:     COLONOSCOPY      HERNIA REPAIR      KIDNEY STONE SURGERY      REPLACEMENT TOTAL KNEE BILATERAL      SHOULDER ARTHROSCOPY Left     TOTAL SHOULDER ARTHROPLASTY W/ DISTAL CLAVICLE EXCISION Right 2020    Procedure: REVERSE TOTAL SHOULDER  ARTHROPLASTY RIGHT;  Surgeon: Steven Raphael MD;  Location:  [facility];  Service: Orthopedics;  Laterality: Right;    TOTAL SHOULDER REVISION Right 2025    Procedure: REVISION REVERSE TOTAL SHOULDER ARTHROPLASTY, EXPLANTATION OF HUMERAL COMPONENT AND PLACEMENT OF PROXIMAL HUMERAL RECONSTRUCTION PROSTHESIS;  Surgeon:  Steven Raphael MD;  Location: [facility];  Service: Orthopedics;  Laterality: Right;      General Information       Row Name 05/23/25 0925          Physical Therapy Time and Intention    Document Type evaluation  -KE     Mode of Treatment physical therapy  -KE       Row Name 05/23/25 0925          General Information    Patient Profile Reviewed yes  -KE     Prior Level of Function independent:;all household mobility;gait;community mobility;driving;min assist:;ADL's  ind w/ all mobility and ADLs, pt still working on farm; denies AD/DME; 1 fall leading to R humeral fx; spouse intermittently assists w/ lower body dressing as needed  -KE     Existing Precautions/Restrictions fall;other (see comments);non-weight bearing;shoulder;right  Donjoy II sling w/ pillow, interscalene nerve cath, wound vac  -KE     Barriers to Rehab medically complex  -KE       Row Name 05/23/25 0925          Living Environment    Current Living Arrangements home  -KE     People in Home spouse  -KE       Row Name 05/23/25 0925          Home Main Entrance    Number of Stairs, Main Entrance none;other (see comments)  ramp  -KE       Row Name 05/23/25 0925          Stairs Within Home, Primary    Number of Stairs, Within Home, Primary none  -KE       Row Name 05/23/25 0925          Cognition    Orientation Status (Cognition) oriented x 3  -KE       Row Name 05/23/25 0925          Safety Issues/Impairments Affecting Functional Mobility    Safety Issues Affecting Function (Mobility) awareness of need for assistance;safety precaution awareness;safety precautions follow-through/compliance;insight into deficits/self-awareness;problem-solving;sequencing abilities;ability to follow commands  -KE     Impairments Affecting Function (Mobility) balance;endurance/activity tolerance;shortness of breath;pain;strength;cognition  -KE     Cognitive Impairments, Mobility Safety/Performance attention;insight into deficits/self-awareness;safety precaution  awareness  -KE     Comment, Safety Issues/Impairments (Mobility) freq redirect for attention to task  -KE               User Key  (r) = Recorded By, (t) = Taken By, (c) = Cosigned By      Initials Name Provider Type    Wendy Betancourt, PT Physical Therapist                   Mobility       Row Name 05/23/25 0931          Bed Mobility    Bed Mobility supine-sit  -KE     Supine-Sit Terry (Bed Mobility) standby assist  -KE     Assistive Device (Bed Mobility) bed rails;head of bed elevated  -KE     Comment, (Bed Mobility) increased time to reach EOB  -KE       Row Name 05/23/25 0931          Transfers    Comment, (Transfers) VCs for maintaining WBing precautions and awareness of NC line throughout; demo good carryover  -KE       Row Name 05/23/25 0931          Sit-Stand Transfer    Sit-Stand Terry (Transfers) contact guard  -KE     Assistive Device (Sit-Stand Transfers) cane, straight  -KE     Comment, (Sit-Stand Transfer) x1 from EOB unsupported, x1 from commode w/ SPC  -KE       Row Name 05/23/25 0931          Gait/Stairs (Locomotion)    Terry Level (Gait) contact guard  -KE     Assistive Device (Gait) cane, straight  -KE     Patient was able to Ambulate yes  -KE     Distance in Feet (Gait) 120  +14 (unsupported)  -KE     Deviations/Abnormal Patterns (Gait) bilateral deviations;gait speed decreased;stride length decreased;ting decreased  -KE     Bilateral Gait Deviations forward flexed posture  -KE     Comment, (Gait/Stairs) Pt ambulating initial bout of gait unsupported, demo mild unsteadiness. Provided SPC for further gait training, demo good sequencing w/ improved stability. No overt LOB or knee buckling noted. Demo step through gait pattern with slowed gait speed, decr heel strike, and decreased foot clearance bilat. Further mobility limited by fatigue. Noted dyspnea following longer bout of gait, SpO2 96%,  -KE               User Key  (r) = Recorded By, (t) = Taken By, (c) = Cosigned  By      Initials Name Provider Type    Wendy Betancourt PT Physical Therapist                   Obj/Interventions       Row Name 05/23/25 0936          Range of Motion Comprehensive    General Range of Motion bilateral lower extremity ROM WFL  -       Row Name 05/23/25 0936          Strength Comprehensive (MMT)    General Manual Muscle Testing (MMT) Assessment lower extremity strength deficits identified  -     Comment, General Manual Muscle Testing (MMT) Assessment hip flexion 4-/5 bilat; BLE grossly 4+/5 otherwise  -KE       Row Name 05/23/25 0936          Balance    Balance Assessment sitting static balance;sitting dynamic balance;standing static balance;standing dynamic balance  -KE     Static Sitting Balance independent  -KE     Dynamic Sitting Balance standby assist  -KE     Position, Sitting Balance sitting edge of bed  -     Static Standing Balance standby assist  -KE     Dynamic Standing Balance contact guard  -     Position/Device Used, Standing Balance supported;cane, straight  -KE     Balance Interventions sitting;standing;sit to stand;supported;static;dynamic  -KE     Comment, Balance mild LOB w/ unsupported ambulation - able self correct w/ stepping strategy; Demo improved steadiness w/ use of SPC; CGA for safety  -       Row Name 05/23/25 0936          Sensory Assessment (Somatosensory)    Sensory Assessment (Somatosensory) LE sensation intact  -               User Key  (r) = Recorded By, (t) = Taken By, (c) = Cosigned By      Initials Name Provider Type    Wendy Betancourt PT Physical Therapist                   Goals/Plan       Row Name 05/23/25 1131          Bed Mobility Goal 1 (PT)    Activity/Assistive Device (Bed Mobility Goal 1, PT) sit to supine/supine to sit  -ERLINDA     [place] Level/Cues Needed (Bed Mobility Goal 1, PT) modified independence  -ERLINDA     Time Frame (Bed Mobility Goal 1, PT) short term goal (STG);5 days  -ERLINDA     Progress/Outcomes (Bed Mobility Goal 1, PT)  new goal  -KE       Row Name 05/23/25 1131          Transfer Goal 1 (PT)    Activity/Assistive Device (Transfer Goal 1, PT) sit-to-stand/stand-to-sit;bed-to-chair/chair-to-bed  -KE     [place] Level/Cues Needed (Transfer Goal 1, PT) modified independence  -KE     Time Frame (Transfer Goal 1, PT) long term goal (LTG);10 days  -KE     Progress/Outcome (Transfer Goal 1, PT) new goal  -KE       Row Name 05/23/25 1131          Gait Training Goal 1 (PT)    Activity/Assistive Device (Gait Training Goal 1, PT) gait (walking locomotion);assistive device use;improve balance and speed;increase endurance/gait distance  -KE     [place] Level (Gait Training Goal 1, PT) modified independence  -KE     Distance (Gait Training Goal 1, PT) 300  -KE     Time Frame (Gait Training Goal 1, PT) long term goal (LTG);10 days  -KE     Progress/Outcome (Gait Training Goal 1, PT) new goal  -KE       Row Name 05/23/25 1131          Therapy Assessment/Plan (PT)    Planned Therapy Interventions (PT) balance training;bed mobility training;gait training;home exercise program;neuromuscular re-education;transfer training;postural re-education;patient/family education;stretching;strengthening;stair training;ROM (range of motion);wheelchair management/propulsion training  -KE               User Key  (r) = Recorded By, (t) = Taken By, (c) = Cosigned By      Initials Name Provider Type    Wendy Betancourt, PT Physical Therapist                   Clinical Impression       Row Name 05/23/25 0937          Pain    Pretreatment Pain Rating 0/10 - no pain  -KE     Posttreatment Pain Rating 0/10 - no pain  -KE     Pain Management Interventions exercise or physical activity utilized;positioning techniques utilized  -KE     Response to Pain Interventions activity participation with tolerable pain  -KE     Additional Documentation Pain Scale: FACES Pre/Post-Treatment (Group)  -       Row Name 05/23/25 7296          Pain Scale: FACES  Pre/Post-Treatment    Pain: FACES Scale, Pretreatment 0-->no hurt  -KE     Posttreatment Pain Rating 0-->no hurt  -KE       Row Name 05/23/25 0938          Plan of Care Review    Plan of Care Reviewed With patient;spouse  -     Outcome Evaluation PT eval complete. Pt presents with R UE immobilization/NWB status, weakness, mild balance deficits, and decreased functional endurance warranting IPPT. Pt ambulating 120 ft w/ SPC, CGA. Recommend continued use of SPC at d/c. PT rec home w/ assist at d/c.  -       Row Name 05/23/25 0938          Therapy Assessment/Plan (PT)    Patient/Family Therapy Goals Statement (PT) return to PLOF  -     Rehab Potential (PT) fair  -     Criteria for Skilled Interventions Met (PT) yes;meets criteria;skilled treatment is necessary  -     Therapy Frequency (PT) daily  -       Row Name 05/23/25 0938          Vital Signs    O2 Delivery Pre Treatment room air  -KE     O2 Delivery Intra Treatment room air  -KE     O2 Delivery Post Treatment room air  -KE     Pre Patient Position Supine  -KE     Intra Patient Position Standing  -KE     Post Patient Position Sitting  -       Row Name 05/23/25 0938          Positioning and Restraints    Pre-Treatment Position in bed  -KE     Post Treatment Position chair  -KE     In Chair notified nsg;reclined;waffle cushion;call light within reach;encouraged to call for assist;exit alarm on;legs elevated;with family/caregiver  -               User Key  (r) = Recorded By, (t) = Taken By, (c) = Cosigned By      Initials Name Provider Type    Wendy Betanocurt, PT Physical Therapist                   Outcome Measures       Row Name 05/23/25 1132 05/23/25 0200       How much help from another person do you currently need...    Turning from your back to your side while in flat bed without using bedrails? 3  -KE 2  -TF    Moving from lying on back to sitting on the side of a flat bed without bedrails? 3  -KE 2  -TF    Moving to and from a bed to a  chair (including a wheelchair)? 3  -KE 2  -TF    Standing up from a chair using your arms (e.g., wheelchair, bedside chair)? 3  -KE 2  -TF    Climbing 3-5 steps with a railing? 3  -KE 1  -TF    To walk in hospital room? 3  -KE 2  -TF    AM-PAC 6 Clicks Score (PT) 18  -KE 11  -TF    Highest Level of Mobility Goal Walk 10 Steps or More-6  -KE Move to Chair/Commode-4  -TF      Row Name 05/23/25 1132          Functional Assessment    Outcome Measure Options AM-PAC 6 Clicks Basic Mobility (PT)  -               User Key  (r) = Recorded By, (t) = Taken By, (c) = Cosigned By      Initials Name Provider Type    Wendy Betancourt PT Physical Therapist    Nida Edwards, RN Registered Nurse                                 Physical Therapy Education       Title: PT OT SLP Therapies (In Progress)       Topic: Physical Therapy (In Progress)       Point: Mobility training (Done)       Learning Progress Summary            Patient Acceptance, E, VU by ERLINDA at 5/23/2025 1132                      Point: Home exercise program (Not Started)       Learner Progress:  Not documented in this visit.              Point: Body mechanics (Done)       Learning Progress Summary            Patient Acceptance, E, VU by ERLINDA at 5/23/2025 1132                      Point: Precautions (Done)       Learning Progress Summary            Patient Acceptance, E, VU by ERLINDA at 5/23/2025 1132                                      User Key       Initials Effective Dates Name Provider Type Discipline     11/16/23 -  Wendy Girard, PT Physical Therapist PT                  PT Recommendation and Plan  Planned Therapy Interventions (PT): balance training, bed mobility training, gait training, home exercise program, neuromuscular re-education, transfer training, postural re-education, patient/family education, stretching, strengthening, stair training, ROM (range of motion), wheelchair management/propulsion training  Outcome Evaluation: PT eval complete. Pt  presents with R UE immobilization/NWB status, weakness, mild balance deficits, and decreased functional endurance warranting IPPT. Pt ambulating 120 ft w/ SPC, CGA. Recommend continued use of SPC at d/c. PT rec home w/ assist at d/c.     Time Calculation:   PT Evaluation Complexity  History, PT Evaluation Complexity: 1-2 personal factors and/or comorbidities  Examination of Body Systems (PT Eval Complexity): total of 3 or more elements  Clinical Presentation (PT Evaluation Complexity): stable  Clinical Decision Making (PT Evaluation Complexity): low complexity  Overall Complexity (PT Evaluation Complexity): low complexity     PT Charges       Row Name 05/23/25 1134             Time Calculation    Start Time 0830  -KE      PT Received On 05/23/25  -KE      PT Goal Re-Cert Due Date 06/02/25  -KE         Timed Charges    91560 - PT Therapeutic Activity Minutes 12  -KE         Untimed Charges    PT Eval/Re-eval Minutes 61  -KE         Total Minutes    Timed Charges Total Minutes 12  -KE      Untimed Charges Total Minutes 61  -KE       Total Minutes 73  -KE                User Key  (r) = Recorded By, (t) = Taken By, (c) = Cosigned By      Initials Name Provider Type    Wendy Betancourt, PT Physical Therapist                  Therapy Charges for Today       Code Description Service Date Service Provider Modifiers Qty    14527095644 HC PT EVAL LOW COMPLEXITY 5 5/23/2025 Wendy Girard, PT  1    32242957088 HC PT THERAPEUTIC ACT EA 15 MIN 5/23/2025 Wendy Girard, PT GP 1            PT G-Codes  Outcome Measure Options: AM-PAC 6 Clicks Basic Mobility (PT)  AM-PAC 6 Clicks Score (PT): 18  PT Discharge Summary  Anticipated Discharge Disposition (PT): home with 24/7 care    Wendy Girard PT  5/23/2025

## 2025-05-23 NOTE — DISCHARGE INSTRUCTIONS
InfuBLOCK - Patient Information    What is a pain pump?  The InfuBLOCK pump delivers post-operative, non-narcotic, numbing medication to the nerve near the surgical site for pain relief.     Where can I find information about my pain pump?           For more information about your pain pump, scan the QR code.  For additional patient resources, visit [email or web address].                                                                                               While your physician is your primary source for information about your treatment there may be times during your treatment that you need assistance with your infusion pump.     If you need assistance take the following steps:    The [employer] Nursing Hotline is Here for You 24/7.  Please call [phone] for the following concerns or complications:    Answers to questions about your infusion pump                 Tubing disconnect  Assistance with pump alarms                                                      Dislodged catheter  Excessive leakage noted from pump                                         Inadequate pain control    2.   [facility] Anesthesia Acute Pain Service: [phone] is available 24/7 for any further needs or concerns about medication or pain control.     -------------------------------------------------------------------------    Nerve Catheter Removal Instructions  When your device is empty:    Remove your catheter by pulling the dressing off slowly (like you would remove a regular bandage). The catheter should pull right out of the skin.  Check that the BLUE tip is intact.                                                                                     If the catheter is stuck, reposition your   extremity and pull slowly until removed.  *If catheter is HURTING and WON'T come out, stop and call [phone] for further assistance.    Remove medication bag from the black carrying case.  Cut the  tubing on right and left side of pump, and discard the medication bag and tubing into garbage.  Place the pump and black carrying case into the plastic bag and then place this into the return box.  Seal box with blue stickers and return to [employer] postal service. THIS IS PRE-PAID POSTAGE.        -------------------------------------------------------------------------    [facility] COLD THERAPY - PATIENT INSTRUCTION SHEET    Cold Compression Therapy for your comfort and rehabilitation  Your caregivers want you to be productive in your rehab and comfortable during your stay. In keeping with those goals, you will be receiving an SMI Cold Therapy Wrap to help ease post-operative pain and swelling that might keep you from getting back on track! Your SMI Cold Therapy Wrap is effective and simple-to-use, and you will be encouraged to apply it throughout your hospital stay and at home through the duration of your recovery.    When you are ready to go home  Be sure to take your SMI Cold Therapy Wrap and both sets of Gel Bags with you for continued comfort and use throughout your rehabilitation. If you don't already have them, ask your nurse or aide to retrieve your SMI Gel Bags from the patient freezer.    Home use precautions  Always follow your medical professional's application instructions upon discharge. Your SMI Cold Therapy Wrap and Gel Bags are designed to last for months following your surgery. Never heat the Gel Bags unless specified by your healthcare provider. Supervision is advised when using this product on children or geriatric patients. To avoid danger of suffocation, please keep the outer plastic packaging away from children & pets.    Cold Therapy Instructions  Place Gel Bags in a freezer set ¾ of the way to max temperature for at least (4) hours. For best results, lay the Gel Bags flat and [id number] in the freezer. Once frozen, slide Gel Bags into the gel pouch and secure your wrap to the affected area with the  straps.  Gel wraps that have been stored in a freezer for an extended period of time may require a (10) minute period of softening up in a room temperature environment before application.  The gel pouch acts as a protective barrier. NEVER place frozen bags directly onto skin, as this may cause frostbite injury.  The [facility] Cold Therapy Wrap is designed to be able to be worm while ambulating. The compression straps can be secured well enough so that the Wrap won't fall off while moving.  Wrap Application Videos can be viewed at [email or web address].  An additional protective barrier such as clothing, a washcloth, hand-towel or pillowcase may be used during prolonged treatment applications.  The Gel-Pouch and Wrap are both Latex-Free and the Gel Bag ingredients are non toxic.    [facility] Wrap care instructions  The [facility] Cold Therapy Wrap may be hand washed and hung to dry when needed.    [facility] re-order information  Additional [facility] body specific wraps and/or Gel Bags can be re-ordered from [email or web address] or call "[employer]-ICE-WRAP ([phone])

## 2025-05-23 NOTE — H&P
Patient Name: Scotty Gore  MRN: [id number]  : 1940  DOS: 2025    Attending: Steven Raphael MD    Primary Care Provider: Sebas Jimenez MD      Chief complaint:  Right Shoulder pain, periprosthetic humerus fracture    Subjective   Patient is a pleasant 85 y.o. male presented for scheduled surgery by Dr. Raphael.    Patient is known to us from a hospitalization in  when he had right reverse total shoulder arthroplasty, did well postop and with subsequent rehab.    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 He was brought to the ED where he was found to have a right periprosthetic proximal humerus fracture     He underwent revision right reverse total shoulder arthroplasty under [facility] and a block, tolerated surgery well, was admitted for further management.    Doing well when seen in PACU postop, his pain is under fair control.  He has no complaints of nausea, vomiting, or shortness of breath.    Reviewed with him his past medical history and home medications.     Allergies:  No Known Allergies    Meds:  Medications Prior to Admission   Medication Sig Dispense Refill Last Dose/Taking    aspirin 81 MG EC tablet Take 1 tablet by mouth Daily.   2025    atorvastatin (LIPITOR) 20 MG tablet Take 0.5 tablets by mouth Daily.   2025    B Complex-C-E-Zn (B COMPLEX-C-E-ZINC) tablet Take 1 tablet by mouth Daily.   2025    clotrimazole-betamethasone (LOTRISONE) 1-0.05 % cream APPLY 1 GRAM TOPICALLY THREE TIMES DAILY   Past Week    docusate sodium (Colace) 100 MG capsule Take 1 capsule by mouth 2 (Two) Times a Day. 60 capsule 0 Past Week    doxepin (SINEquan) 10 MG capsule    2025    fluticasone (FLONASE) 50 MCG/ACT nasal spray 2 sprays by Each Nare route Daily. Shake well before using.   2025    furosemide (LASIX) 20 MG tablet TAKE 1 TABLET BY MOUTH DAILY (Patient taking differently: Take 1 tablet by mouth As  Needed.) 30 tablet 1 Past Week    glyburide (DIAbeta) 5 MG tablet Take 1 tablet by mouth Daily With Breakfast.   5/22/2025    ibuprofen (ADVIL,MOTRIN) 400 MG tablet    5/22/2025    Jardiance 25 MG tablet tablet Take 1 tablet by mouth Daily.   5/22/2025    lisinopril (PRINIVIL,ZESTRIL) 20 MG tablet Take 1 tablet by mouth 2 (Two) Times a Day.   5/22/2025    meloxicam (MOBIC) 15 MG tablet Take 1 tablet by mouth As Needed.   5/22/2025    metFORMIN (GLUCOPHAGE) 500 MG tablet Take 2 tablets by mouth 2 (Two) Times a Day With Meals.   5/22/2025    montelukast (SINGULAIR) 10 MG tablet Take 1 tablet by mouth Every Night.   5/21/2025    Multiple Vitamins-Minerals (PRESERVISION AREDS 2 PO) Take 1 tablet by mouth Daily.   5/22/2025    pioglitazone (ACTOS) 30 MG tablet Take 1 tablet by mouth Daily.   5/22/2025    potassium chloride 10 MEQ CR tablet TAKE 1 TABLET BY MOUTH DAILY 30 tablet 1 5/22/2025    tamsulosin (FLOMAX) 0.4 MG capsule 24 hr capsule Take 1 capsule by mouth Daily.   5/22/2025    tolterodine (DETROL) 1 MG tablet Take 1 tablet by mouth.   5/22/2025    VENTOLIN  (90 Base) MCG/ACT inhaler Inhale 1 puff As Needed. PT. STATED THAT HE HAS NOT HAD TO USE THIS YET.   Past Month    BREO ELLIPTA 100-25 MCG/INH aerosol powder  Inhale 1 puff As Needed.       nitroglycerin (NITROSTAT) 0.4 MG SL tablet Place 1 tablet under the tongue As Needed for Chest Pain. Take no more than 3 doses in 15 minutes.       nitroglycerin (NITROSTAT) 0.4 MG SL tablet 1 under the tongue as needed for angina, may repeat q5mins for up three doses 100 tablet 11          Past Medical History:   Diagnosis Date    Arthritis     Asthma     Cancer     PROSTATE CA    Diabetes mellitus     DX. 2006, FSBS 1 X 3-4 DAYS    High cholesterol     History of kidney stones     History of radiation therapy     for prostate cancer     Hypertension     SOB (shortness of breath)     Wears dentures      Past Surgical History:   Procedure Laterality Date    CARDIAC  "CATHETERIZATION N/A 1/30/2018    Procedure: Left Heart Cath;  Surgeon: Bradley Sandhu III, MD;  Location:  [facility];  Service:     COLONOSCOPY      HERNIA REPAIR      KIDNEY STONE SURGERY      REPLACEMENT TOTAL KNEE BILATERAL      SHOULDER ARTHROSCOPY Left     TOTAL SHOULDER ARTHROPLASTY W/ DISTAL CLAVICLE EXCISION Right 8/13/2020    Procedure: REVERSE TOTAL SHOULDER  ARTHROPLASTY RIGHT;  Surgeon: Steven Raphael MD;  Location:  [facility];  Service: Orthopedics;  Laterality: Right;     History reviewed. No pertinent family history.  Social History     Tobacco Use    Smoking status: Never    Smokeless tobacco: Never   Vaping Use    Vaping status: Never Used   Substance Use Topics    Alcohol use: No    Drug use: No       Review of Systems  Pertinent items are noted in HPI    Vital Signs  /66 (BP Location: Left arm, Patient Position: Lying)   Pulse 61   Temp 97.5 °F (36.4 °C) (Oral)   Resp 18   Ht 177.8 cm (70\")   Wt 98.6 kg (217 lb 4.8 oz)   SpO2 93%   BMI 31.18 kg/m²     Physical Exam:    General Appearance:    Alert, cooperative, in no acute distress   Head:    Normocephalic, without obvious abnormality, atraumatic   Eyes:            Lids and lashes normal, conjunctivae and sclerae normal, no   icterus, no pallor, corneas clear,    Ears:    Ears appear intact with no abnormalities noted   Throat:   No oral lesions, no thrush, oral mucosa moist   Neck:   No adenopathy, supple, trachea midline, no thyromegaly         Lungs:     Clear to auscultation,respirations regular, even and                   unlabored    Heart:    Regular rhythm and normal rate, normal S1 and S2, no      murmur    Abdomen:     Normal bowel sounds, no masses, no organomegaly, soft        nontender, nondistended, no guarding, no rebound                 tenderness   Genitalia:    Deferred   Extremities:   RUE in a sling, swelling and ecchymosis of the right upper arm.  CDI Prevena suction dressing on " "surgical incision. Interscalene nerve block cath present.  Distal pulses, cap refill, movements of fingers, wrist, intact.     Pulses:   Pulses palpable and equal bilaterally   Skin:   No bleeding, bruising or rash   Neurologic:   Cranial nerves 2 - 12 grossly intact      I reviewed the patient's new clinical results.             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 PT/OT. NWB, RUE, ROM hand, wrist, elbow.  2. Pain control-prns, interscalene nerve block cath with ropivacaine infusion.   3. IS-encourage  4. DVT proph- Mech/ mobilize.  5. Bowel regimen  6. Resume home medications as appropriate  7. Monitor post-op labs  8. DC planning     - Hypertension:  Resume home medications as appropriate, formulary substitution when indicated.  Holding parameters.  PRN medications for elevated blood pressure.    -Dyslipidemia:  Resume home regimen statin ( formulary substitution when appropriate).    -DM type 2  Hgb A1C 8.5  Hold OHA as appropriate  FSBG before meals/bedtime, ( q 6 when NPO), along with correction Humalog.  Long acting insulin if needed.      Dragon disclaimer:  Part of this encounter note is an electronic transcription/translation of spoken language to printed text. The electronic translation of spoken language may permit erroneous, or at times, nonsensical words or phrases to be inadvertently transcribed; Although I have reviewed the note for such errors, some may still exist.    Anibal Vasquez MD  05/22/25  22:02 EDT          "

## 2025-05-23 NOTE — THERAPY DISCHARGE NOTE
Acute Care - Occupational Therapy Discharge  [facility]    Patient Name: Scotty Gore  : 1940    MRN: [id number]                              Today's Date: 2025       Admit Date: 2025    Visit Dx:     ICD-10-CM ICD-9-CM   1. 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 , FSBS 1 X 3-4 DAYS    High cholesterol     History of kidney stones     History of radiation therapy     for prostate cancer     Hypertension     SOB (shortness of breath)     Wears dentures      Past Surgical History:   Procedure Laterality Date    CARDIAC CATHETERIZATION N/A 2018    Procedure: Left Heart Cath;  Surgeon: Bradley Sandhu III, MD;  Location:  [facility];  Service:     COLONOSCOPY      HERNIA REPAIR      KIDNEY STONE SURGERY      REPLACEMENT TOTAL KNEE BILATERAL      SHOULDER ARTHROSCOPY Left     TOTAL SHOULDER ARTHROPLASTY W/ DISTAL CLAVICLE EXCISION Right 2020    Procedure: REVERSE TOTAL SHOULDER  ARTHROPLASTY RIGHT;  Surgeon: Steven Raphael MD;  Location:  [facility];  Service: Orthopedics;  Laterality: Right;    TOTAL SHOULDER REVISION Right 2025    Procedure: REVISION REVERSE TOTAL SHOULDER ARTHROPLASTY, EXPLANTATION OF HUMERAL COMPONENT AND PLACEMENT  OF PROXIMAL HUMERAL RECONSTRUCTION PROSTHESIS;  Surgeon: Steven Raphael MD;  Location: [facility];  Service: Orthopedics;  Laterality: Right;      General Information       Row Name 05/23/25 1135          OT Time and Intention    Subjective Information no complaints  -TB     Document Type discharge evaluation/summary;therapy note (daily note)  -TB     Mode of Treatment occupational therapy;individual therapy  -TB     Patient Effort good  -TB     Symptoms Noted During/After Treatment none  -TB       Row Name 05/23/25 1135          General Information    Patient Profile Reviewed yes  -TB     Prior Level of Function all household mobility;community mobility;ADL's;driving  -TB     Existing Precautions/Restrictions fall;right;shoulder;non-weight bearing;other (see comments)  s/p R rTSA with NWB precautions, [place] with Abduction pillow, IS nerve catheter, wound vac  -TB     Barriers to Rehab medically complex  -TB       Row Name 05/23/25 1135          Occupational Profile    Reason for Services/Referral (Occupational Profile) Occupational decline  -TB       Row Name 05/23/25 1135          Home Main Entrance    Number of Stairs, Main Entrance none;other (see comments)  Ramp  -TB       Row Name 05/23/25 1135          Stairs Within Home, Primary    Number of Stairs, Within Home, Primary none  -TB       Row Name 05/23/25 1135          Cognition    Orientation Status (Cognition) oriented x 3  -TB       Row Name 05/23/25 1135          Safety Issues/Impairments Affecting Functional Mobility    Safety Issues Affecting Function (Mobility) insight into deficits/self-awareness;awareness of need for assistance;safety precaution awareness;safety precautions follow-through/compliance;sequencing abilities;problem-solving  -TB     Impairments Affecting Function (Mobility) balance;endurance/activity tolerance;pain;strength;sensation/sensory awareness;range of motion (ROM)  -TB     Comment, Safety Issues/Impairments (Mobility)  Requires frequent redirection  -TB               User Key  (r) = Recorded By, (t) = Taken By, (c) = Cosigned By      Initials Name Provider Type    TB Spring Galvan, OT Occupational Therapist                   Mobility/ADL's       Row Name 05/23/25 1138          Bed Mobility    Comment, (Bed Mobility) [facility]. Pt plans to sleep in recliner at d/c  -TB       Row Name 05/23/25 1138          Transfers    Transfers sit-stand transfer;stand-sit transfer  -TB     Comment, (Transfers) Education, cues, and assist for sequencing and attn to lines to prevent accidental dislodgement. STS x3 reps with ADL performance  -TB       Row Name 05/23/25 1138          Sit-Stand Transfer    Sit-Stand Dukes (Transfers) contact guard;verbal cues  -TB       Row Name 05/23/25 1138          Stand-Sit Transfer    Stand-Sit Dukes (Transfers) contact guard;verbal cues  -TB       Row Name 05/23/25 1138          Functional Mobility    Patient was able to Ambulate --  ADL/HEP focus for d/c  -TB       Row Name 05/23/25 1138          Activities of Daily Living    BADL Assessment/Intervention bathing;upper body dressing;lower body dressing;feeding;toileting  -TB       Row Name 05/23/25 1138          Mobility    Extremity Weight-bearing Status right upper extremity  -TB     Right Upper Extremity (Weight-bearing Status) non weight-bearing (NWB)   -       Row Name 05/23/25 1138          Bathing Assessment/Intervention    Dukes Level (Bathing) upper body;bathing skills;maximum assist (25% patient effort);verbal cues  -TB     Position (Bathing) unsupported sitting  -TB     Comment, (Bathing) Patient educated on shoulder precautions with axilla care. Patient educated that nerve catheter cannot get wet. Spouse present and participating in all teaching.  -TB       Row Name 05/23/25 1138          Upper Body Dressing Assessment/Training    Dukes Level (Upper Body Dressing) doff;pajama/robe;don;front opening garment;maximum  assist (25% patient effort);verbal cues  sling mgmt/proper fit  -TB     Position (Upper Body Dressing) unsupported sitting  -TB     Comment, (Upper Body Dressing) Patient educated on shoulder precautions with ADL retraining and sling management. Patient educated on nerve catheter and wound vac mgmt to avoid dislodgement. Spouse present and participating in all teaching.  -TB       Row Name 05/23/25 1138          Lower Body Dressing Assessment/Training    Yolo Level (Lower Body Dressing) don;pants/bottoms;shoes/slippers;maximum assist (25% patient effort);verbal cues  -TB     Position (Lower Body Dressing) unsupported sitting;supported standing  -TB       Row Name 05/23/25 1138          Self-Feeding Assessment/Training    Yolo Level (Feeding) independent;liquids to mouth  -TB     Position (Feeding) supported sitting  -TB       Row Name 05/23/25 1138          Toileting Assessment/Training    Yolo Level (Toileting) set up  -TB     Assistive Devices (Toileting) urinal  -TB               User Key  (r) = Recorded By, (t) = Taken By, (c) = Cosigned By      Initials Name Provider Type    TB Spring Galvan OT Occupational Therapist                   Obj/Interventions       Row Name 05/23/25 1141          Sensory Assessment (Somatosensory)    Sensory Assessment (Somatosensory) right UE  -TB     Sensory Subjective Reports numbness;tingling  -TB     Sensory Assessment RUE IS nerve catheter infusing  -TB       Row Name 05/23/25 1141          Sensory Interventions    Sensory Re-education (Sensation) visual observation of sensory input  -TB       Row Name 05/23/25 1141          Range of Motion Comprehensive    Comment, General Range of Motion L shoulder deficits, remaining LUE WFL. RUE deferred.  -TB       Row Name 05/23/25 1141          Strength Comprehensive (MMT)    Comment, General Manual Muscle Testing (MMT) Assessment Generalized weakness/deconditioned. MMT deferred.  -TB       Row Name  05/23/25 1141          Elbow/Forearm (Therapeutic Exercise)    Elbow/Forearm (Therapeutic Exercise) AAROM (active assistive range of motion);AROM (active range of motion)  -     Elbow/Forearm AROM (Therapeutic Exercise) right;supination;pronation;sitting;10 repetitions  -     Elbow/Forearm AAROM (Therapeutic Exercise) right;flexion;extension;sitting;10 repetitions  -       Row Name 05/23/25 1141          Wrist (Therapeutic Exercise)    Wrist (Therapeutic Exercise) AROM (active range of motion)  -     Wrist AROM (Therapeutic Exercise) right;flexion;extension;10 repetitions  -       Row Name 05/23/25 1141          Hand (Therapeutic Exercise)    Hand (Therapeutic Exercise) AROM (active range of motion)  -     Hand AROM/AAROM (Therapeutic Exercise) right;AROM (active range of motion);finger flexion;finger extension;10 repetitions  -       Row Name 05/23/25 1141          Motor Skills    Therapeutic Exercise hand;wrist;elbow/forearm  Education completed for KAYLEIGH HEP per MD parameters @ hand, wrist, and elbow only. Pt demonstrates good understanding. Spouse present and participating in all teaching.  -       Row Name 05/23/25 1141          Balance    Balance Assessment sitting static balance;sitting dynamic balance;sit to stand dynamic balance;standing static balance;standing dynamic balance  -     Static Sitting Balance independent  -     Dynamic Sitting Balance standby assist  -     Position, Sitting Balance sitting in chair  -     Sit to Stand Dynamic Balance contact guard;verbal cues  -     Static Standing Balance standby assist  -     Dynamic Standing Balance contact guard  -     Position/Device Used, Standing Balance supported;cane, straight  -     Balance Interventions sitting;standing;sit to stand;supported;static;dynamic;dynamic reaching;occupation based/functional task  -     Comment, Balance No LOB with dynamic ADL activities  -               User Key  (r) = Recorded By, (t) =  Taken By, (c) = Cosigned By      Initials Name Provider Type    TB Spring Galvan, OT Occupational Therapist                   Goals/Plan    No documentation.                  Clinical Impression       Row Name 05/23/25 1144          Pain Assessment    Pretreatment Pain Rating 0/10 - no pain  -TB     Posttreatment Pain Rating 0/10 - no pain  -TB     Pre/Posttreatment Pain Comment Pt denies pain throughout session  -TB       Row Name 05/23/25 1144          Plan of Care Review    Plan of Care Reviewed With patient;spouse  -TB     Progress --  IE  -TB     Outcome Evaluation OT IE completed. Pt is A/Ox3 and participates in therapy with frequent redirection. Education completed for R shoulder precautions, sling teaching, and ADL retraining including instruction for nerve catheter and wound vac mgmt. Max A UB/LB ADLs and sling application. Spouse present and participating in all teaching. Pt transfers with CGA. No LOB this session. Extensive teaching completed for home safety and fall prevention. Pt reports excellent pain control and RA sats are stable 93-96%. OT will d/c at this time. Recommend home with 24/7 assist today.  -TB       Row Name 05/23/25 1144          Therapy Assessment/Plan (OT)    Therapy Frequency (OT) evaluation only  -TB       Row Name 05/23/25 1144          Therapy Plan Review/Discharge Plan (OT)    Anticipated Discharge Disposition (OT) home with 24/7 care  -TB       Row Name 05/23/25 1144          Vital Signs    Pre Systolic BP Rehab --  RN cleared OT  -TB     Pre SpO2 (%) 93  -TB     O2 Delivery Pre Treatment room air  -TB     Post SpO2 (%) 96  -TB     O2 Delivery Post Treatment room air  -TB     Pre Patient Position Sitting  -TB     Intra Patient Position Standing  -TB     Post Patient Position Sitting  -TB       Row Name 05/23/25 1144          Positioning and Restraints    Pre-Treatment Position sitting in chair/recliner  -TB     Post Treatment Position chair  -TB     In Chair notified  nsg;reclined;call light within reach;encouraged to call for assist;exit alarm on;waffle cushion;legs elevated;with brace  -TB               User Key  (r) = Recorded By, (t) = Taken By, (c) = Cosigned By      Initials Name Provider Type    Spring Torres OT Occupational Therapist                   Outcome Measures       Row Name 05/23/25 1148          How much help from another is currently needed...    Putting on and taking off regular lower body clothing? 2  -TB     Bathing (including washing, rinsing, and drying) 2  -TB     Toileting (which includes using toilet bed pan or urinal) 2  -TB     Putting on and taking off regular upper body clothing 2  -TB     Taking care of personal grooming (such as brushing teeth) 3  -TB     Eating meals 3  -TB     AM-PAC 6 Clicks Score (OT) 14  -TB       Row Name 05/23/25 1132 05/23/25 0200       How much help from another person do you currently need...    Turning from your back to your side while in flat bed without using bedrails? 3  -KE 2  -TF    Moving from lying on back to sitting on the side of a flat bed without bedrails? 3  -KE 2  -TF    Moving to and from a bed to a chair (including a wheelchair)? 3  -KE 2  -TF    Standing up from a chair using your arms (e.g., wheelchair, bedside chair)? 3  -KE 2  -TF    Climbing 3-5 steps with a railing? 3  -KE 1  -TF    To walk in hospital room? 3  -KE 2  -TF    AM-PAC 6 Clicks Score (PT) 18  -KE 11  -TF    Highest Level of Mobility Goal Walk 10 Steps or More-6  -KE Move to Chair/Commode-4  -TF      Row Name 05/23/25 1148 05/23/25 1132       Functional Assessment    Outcome Measure Options AM-PAC 6 Clicks Daily Activity (OT)  -TB AM-PAC 6 Clicks Basic Mobility (PT)  -KE              User Key  (r) = Recorded By, (t) = Taken By, (c) = Cosigned By      Initials Name Provider Type    Spring Torres, OT Occupational Therapist    KE Wendy Girard, PT Physical Therapist    TF Nida Daniels, RN Registered Nurse                   Occupational Therapy Education       Title: PT OT SLP Therapies (In Progress)       Topic: Occupational Therapy (In Progress)       Point: ADL training (Done)       Learning Progress Summary            Patient Acceptance, E,D,TB, VU,DU,NR by TB at 5/23/2025 1149    Comment: Spouse present and participating in all teaching. Recommend home with 24/7 assist from spouse to manage lines and maintain precautions.   Family Acceptance, E,D,TB, VU,DU,NR by TB at 5/23/2025 1149    Comment: Spouse present and participating in all teaching. Recommend home with 24/7 assist from spouse to manage lines and maintain precautions.                      Point: Home exercise program (Done)       Learning Progress Summary            Patient Acceptance, E,D,TB, VU,DU,NR by TB at 5/23/2025 1149    Comment: Spouse present and participating in all teaching. Recommend home with 24/7 assist from spouse to manage lines and maintain precautions.   Family Acceptance, E,D,TB, VU,DU,NR by TB at 5/23/2025 1149    Comment: Spouse present and participating in all teaching. Recommend home with 24/7 assist from spouse to manage lines and maintain precautions.                      Point: Precautions (Done)       Learning Progress Summary            Patient Acceptance, E,D,TB, VU,DU,NR by TB at 5/23/2025 1149    Comment: Spouse present and participating in all teaching. Recommend home with 24/7 assist from spouse to manage lines and maintain precautions.   Family Acceptance, E,D,TB, VU,DU,NR by TB at 5/23/2025 1149    Comment: Spouse present and participating in all teaching. Recommend home with 24/7 assist from spouse to manage lines and maintain precautions.                                      User Key       Initials Effective Dates Name Provider Type Discipline    TB 07/11/23 -  Spring Galvan OT Occupational Therapist OT                  OT Recommendation and Plan  Therapy Frequency (OT): evaluation only  Plan of Care Review  Plan  of Care Reviewed With: patient, spouse  Progress:  (IE)  Outcome Evaluation: OT IE completed. Pt is A/Ox3 and participates in therapy with frequent redirection. Education completed for R shoulder precautions, sling teaching, and ADL retraining including instruction for nerve catheter and wound vac mgmt. Max A UB/LB ADLs and sling application. Spouse present and participating in all teaching. Pt transfers with CGA. No LOB this session. Extensive teaching completed for home safety and fall prevention. Pt reports excellent pain control and RA sats are stable 93-96%. OT will d/c at this time. Recommend home with 24/7 assist today.  Plan of Care Reviewed With: patient, spouse  Outcome Evaluation: OT IE completed. Pt is A/Ox3 and participates in therapy with frequent redirection. Education completed for R shoulder precautions, sling teaching, and ADL retraining including instruction for nerve catheter and wound vac mgmt. Max A UB/LB ADLs and sling application. Spouse present and participating in all teaching. Pt transfers with CGA. No LOB this session. Extensive teaching completed for home safety and fall prevention. Pt reports excellent pain control and RA sats are stable 93-96%. OT will d/c at this time. Recommend home with 24/7 assist today.     Time Calculation:   Evaluation Complexity (OT)  Review Occupational Profile/Medical/Therapy History Complexity: expanded/moderate complexity  Assessment, Occupational Performance/Identification of Deficit Complexity: 3-5 performance deficits  Clinical Decision Making Complexity (OT): detailed assessment/moderate complexity  Overall Complexity of Evaluation (OT): moderate complexity     Time Calculation- OT       Row Name 05/23/25 1037             Time Calculation- OT    OT Start Time 1037  -TB      OT Received On 05/23/25  -TB         Timed Charges    52342 - OT Therapeutic Exercise Minutes 12  -TB      16653 - OT Self Care/Mgmt Minutes 25  -TB         Untimed Charges    OT  Eval/Re-eval Minutes 38  -TB         Total Minutes    Timed Charges Total Minutes 37  -TB      Untimed Charges Total Minutes 38  -TB       Total Minutes 75  -TB                User Key  (r) = Recorded By, (t) = Taken By, (c) = Cosigned By      Initials Name Provider Type    TB Spring Galvan OT Occupational Therapist                  Therapy Charges for Today       Code Description Service Date Service Provider Modifiers Qty    64711211486 HC OT THER PROC EA 15 MIN 5/23/2025 Spring Galvan OT GO 1    69711949740 HC OT SELF CARE/MGMT/TRAIN EA 15 MIN 5/23/2025 Spring Galvan OT GO 2    71489126419  OT EVAL MOD COMPLEXITY 3 5/23/2025 Spring Galvan OT GO 1               OT Discharge Summary  Anticipated Discharge Disposition (OT): home with 24/7 care    Spring Galvan OT  5/23/2025

## 2025-05-23 NOTE — CASE MANAGEMENT/SOCIAL WORK
Discharge Planning Assessment  [facility]     Patient Name: Scotty Gore  MRN: [id number]  Today's Date: 5/23/2025    Admit Date: 5/22/2025    Plan: home   Discharge Needs Assessment       Row Name 05/23/25 1135       Living Environment    People in Home spouse  Simultaneous filing. User may be unaware of other data.    Current Living Arrangements home  Simultaneous filing. User may be unaware of other data.    Potentially Unsafe Housing Conditions none    In the past 12 months has the electric, gas, oil, or water company threatened to shut off services in your home? No    Primary Care Provided by self    Provides Primary Care For no one    Family Caregiver if Needed spouse    Quality of Family Relationships helpful;involved;supportive    Able to Return to Prior Arrangements yes       Resource/Environmental Concerns    Resource/Environmental Concerns none       Transportation Needs    In the past 12 months, has lack of transportation kept you from medical appointments or from getting medications? no    In the past 12 months, has lack of transportation kept you from meetings, work, or from getting things needed for daily living? No       Food Insecurity    Within the past 12 months, you worried that your food would run out before you got the money to buy more. Never true    Within the past 12 months, the food you bought just didn't last and you didn't have money to get more. Never true       Transition Planning    Patient/Family Anticipates Transition to home;home with family    Patient/Family Anticipated Services at Transition none    Transportation Anticipated family or friend will provide       Discharge Needs Assessment    Readmission Within the Last 30 Days no previous admission in last 30 days    Equipment Currently Used at Home none    Concerns to be Addressed no discharge needs identified;denies needs/concerns at this time                   Discharge Plan       Row Name 05/23/25 1136       Plan    Plan  home    Patient/Family in Agreement with Plan yes    Plan Comments Met with patient at the bedside to initiate discharge planning. Patient lives with his wife in a house in [facility] [place]. At baseline, patient is independent with ADLs and has no DME. Patient denies any home health services or home oxygen use. Patient has Medicare and AARP supplement and prefers to fill scripts at the [facility] in [place]. Patient's plan is home. Wife will transport. No discharge needs identified.    Final Discharge Disposition Code 01 - home or self-care                    Expected Discharge Date and Time       Expected Discharge Date Expected Discharge Time    May 23, 2025            Demographic Summary       Row Name 05/23/25 1135       General Information    Admission Type inpatient    Arrived From home    Referral Source physician    Reason for Consult discharge planning    Preferred Language English    General Information Comments PCP Sebas Jimenez       Contact Information    Permission Granted to Share Info With family/designee    Contact Information Comments Morena Gore (Spouse)  [phone] (Mobile)                   Functional Status       Row Name 05/23/25 1135       Functional Status    Usual Activity Tolerance excellent    Current Activity Tolerance good       Functional Status, IADL    Medications independent    Meal Preparation independent    Housekeeping independent    Laundry independent    Shopping independent       Mental Status    General Appearance WDL WDL       Mental Status Summary    Recent Changes in Mental Status/Cognitive Functioning no changes       Employment/    Employment Status --                   Psychosocial    No documentation.                  Abuse/Neglect    No documentation.                  Legal    No documentation.                  Substance Abuse    No documentation.                  Patient Forms    No documentation.                     John Goodson RN

## 2025-05-23 NOTE — PLAN OF CARE
Problem: Shoulder Arthroplasty (Total, Umang, Reverse)  Goal: Optimal Functional Ability  Outcome: Not Progressing  Intervention: Promote Optimal Functional Status  Recent Flowsheet Documentation  Taken 5/23/2025 0525 by Nida Daniels RN  Activity Management: back to bed  Assistive Device Utilized:   gait belt   walker  Self-Care Promotion: independence encouraged  Taken 5/23/2025 0200 by Nida Daniels RN  Activity Management: back to bed  Assistive Device Utilized: gait belt  Self-Care Promotion: independence encouraged  Taken 5/22/2025 2200 by Nida Daniels RN  Activity Management: back to bed  Assistive Device Utilized: gait belt  Self-Care Promotion: independence encouraged  Taken 5/22/2025 2011 by Nida Daniels RN  Activity Management: back to bed  Assistive Device Utilized: gait belt     Problem: Shoulder Arthroplasty (Total, Umang, Reverse)  Goal: Effective Bowel Elimination  Outcome: Not Progressing     Problem: Adult Inpatient Plan of Care  Goal: Readiness for Transition of Care  Outcome: Not Progressing  Intervention: Mutually Develop Transition Plan  Recent Flowsheet Documentation  Taken 5/22/2025 2011 by Nida Daniels RN  Transportation Anticipated: family or friend will provide  Patient/Family Anticipated Services at Transition: none  Patient/Family Anticipates Transition to: home with family     Problem: Adult Inpatient Plan of Care  Goal: Optimal Comfort and Wellbeing  Outcome: Not Progressing  Intervention: Monitor Pain and Promote Comfort  Recent Flowsheet Documentation  Taken 5/23/2025 0525 by Nida Daniels RN  Pain Management Interventions: pain pump in use  Taken 5/23/2025 0200 by iNda Daniels RN  Pain Management Interventions: pain pump in use  Taken 5/22/2025 2200 by Nida Daniels RN  Pain Management Interventions: pain pump in use  Taken 5/22/2025 2011 by Nida Daniels RN  Pain Management Interventions: pain pump in use  Intervention: Provide Person-Centered Care  Recent Flowsheet  Documentation  Taken 5/23/2025 0525 by Nida Daniels RN  Trust Relationship/Rapport:   care explained   thoughts/feelings acknowledged  Taken 5/23/2025 0200 by Nida Daniels RN  Trust Relationship/Rapport: care explained  Taken 5/22/2025 2200 by Nida Daniels RN  Trust Relationship/Rapport: care explained  Taken 5/22/2025 2011 by Nida Daniels RN  Trust Relationship/Rapport: care explained   Goal Outcome Evaluation:

## 2025-05-23 NOTE — PLAN OF CARE
Goal Outcome Evaluation:  Plan of Care Reviewed With: patient, spouse           Outcome Evaluation: PT eval complete. Pt presents with R UE immobilization/NWB status, weakness, mild balance deficits, and decreased functional endurance warranting IPPT. Pt ambulating 120 ft w/ SPC, CGA. Recommend continued use of SPC at d/c. PT rec home w/ assist at d/c    Anticipated Discharge Disposition (PT): home with 24/7 care

## 2025-05-23 NOTE — PLAN OF CARE
Problem: Adult Inpatient Plan of Care  Goal: Plan of Care Review  Recent Flowsheet Documentation  Taken 5/23/2025 1144 by Spring Galvan OT  Progress: (IE) --  Outcome Evaluation: OT IE completed. Pt is A/Ox3 and participates in therapy with frequent redirection. Education completed for R shoulder precautions, sling teaching, and ADL retraining including instruction for nerve catheter and wound vac mgmt. Max A UB/LB ADLs and sling application. Spouse present and participating in all teaching. Pt transfers with CGA. No LOB this session. Extensive teaching completed for home safety and fall prevention. Pt reports excellent pain control and RA sats are stable 93-96%. OT will d/c at this time. Recommend home with 24/7 assist today.

## 2025-05-24 NOTE — DISCHARGE SUMMARY
Patient Name: Scotty Gore  MRN: [id number]  : 1940  DOS: 2025    Attending: No att. 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 male presented for scheduled surgery by Dr. Raphael.     Patient is known to us from a hospitalization in  when he had right reverse total shoulder arthroplasty, did well postop and with subsequent rehab.     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 He was brought to the ED where he was found to have a right periprosthetic proximal humerus fracture      He underwent revision right reverse total shoulder arthroplasty under GA and a block, tolerated surgery well, was admitted for further management.     Doing well when seen in PACU postop, his pain is under fair control.  He has no complaints of nausea, vomiting, or shortness of breath.     Reviewed with him his past medical history and home medications.    After admit:  Patient was provided pain medications as needed for pain control, along with interscalene nerve block infusion of Ropivacaine    Adjustments were made to pain medications to optimize postop pain management.     Risks and benefits of opiate medications discussed with patient. WINSOME report on chart was reviewed.    The patient was seen by OT and was taught exercises for right arm.  The patient used an IS for atelectasis prophylaxis and mechanicals for DVT prophylaxis.    Home medications were resumed as appropriate, and labs were monitored and  remained fairly stable.     With the progress he has made, pt is ready for DC home today.      The patient will have an Infupump ( instructed on it during this admit)  Discussed with patient regarding plan and he shows understanding and agreement.        Procedures Performed  Procedure(s):  REVISION REVERSE TOTAL SHOULDER ARTHROPLASTY, EXPLANTATION OF HUMERAL COMPONENT AND PLACEMENT OF PROXIMAL HUMERAL RECONSTRUCTION PROSTHESIS       Pertinent Test Results:    I reviewed the patient's new clinical results.   Results from last 7 days   Lab Units 05/23/25  1218   WBC 10*3/mm3 8.94   HEMOGLOBIN g/dL 9.4*   HEMATOCRIT % 30.7*   PLATELETS 10*3/mm3 335     Results from last 7 days   Lab Units 05/23/25  1218   SODIUM mmol/L 136   POTASSIUM mmol/L 4.9   CHLORIDE mmol/L 102   CO2 mmol/L 18.0*   BUN mg/dL 28*   CREATININE mg/dL 1.32*   CALCIUM mg/dL 8.8   GLUCOSE mg/dL 194*     I reviewed the patient's new imaging including images and reports.      Occupational Therapy       Problem: Adult Inpatient Plan of Care  Goal: Plan of Care Review  Recent Flowsheet Documentation  Taken 5/23/2025 1144 by Spring Galvan OT  Progress: (IE) --  Outcome Evaluation: OT IE completed. Pt is A/Ox3 and participates in therapy with frequent redirection. Education completed for R shoulder precautions, sling teaching, and ADL retraining including instruction for nerve catheter and wound vac mgmt. Max A UB/LB ADLs and sling application. Spouse present and participating in all teaching. Pt transfers with CGA. No LOB this session. Extensive teaching completed for home safety and fall prevention. Pt reports excellent pain control and RA sats are stable 93-96%. OT will d/c at this time. Recommend home with 24/7 assist today.             Physical therapy  Wendy Girard, PT   Physical Therapist  Specialty: Physical Therapy     Plan of Care      Signed     Date of Service: 05/23/25 0830  Creation Time: 05/23/25 1133     Signed         Goal  "Outcome Evaluation:  Plan of Care Reviewed With: patient, spouse  Outcome Evaluation: PT eval complete. Pt presents with R UE immobilization/NWB status, weakness, mild balance deficits, and decreased functional endurance warranting IPPT. Pt ambulating 120 ft w/ SPC, CGA. Recommend continued use of SPC at d/c. PT rec home w/ assist at d/c     Anticipated Discharge Disposition (PT): home with 24/7 care                   Discharge Assessment:       Visit Vitals  /61   Pulse 61   Temp 98.3 °F (36.8 °C) (Oral)   Resp 18   Ht 177.8 cm (70\")   Wt 98.6 kg (217 lb 4.8 oz)   SpO2 97%   BMI 31.18 kg/m²     No data recorded.      General Appearance:    Alert, cooperative, in no acute distress   Lungs:     Clear to auscultation,respirations regular, even and   unlabored    Heart:    Regular rhythm and normal rate, normal S1 and S2    Abdomen:     Normal bowel sounds, no masses, no organomegaly, soft        nontender, nondistended, no guarding, no rebound                 tenderness   Extremities:     RUE in a sling, swelling and ecchymosis of the right upper arm.  CDI Prevena suction dressing on surgical incision. Interscalene nerve block cath present.  Distal pulses, cap refill, movements of fingers, wrist, intact     Pulses:   Pulses palpable and equal bilaterally   Skin:   No bleeding, bruising or rash        Discharge Disposition:home.           Discharge Medications        New Medications        Instructions Start Date   acetaminophen 500 MG tablet  Commonly known as: TYLENOL   1,000 mg, Oral, Every 8 Hours, Change to every 8 hours  as needed after 1 week      oxyCODONE 5 MG immediate release tablet  Commonly known as: Roxicodone   5 mg, Oral, Every 4 Hours PRN      ropivacaine 0.2 % infusion (INFUSYSTEM)  Commonly known as: NAROPIN   2 mL/hr (4 mg/hr), Peripheral Nerve, Continuous             Continue These Medications        Instructions Start Date   atorvastatin 20 MG tablet  Commonly known as: LIPITOR   10 mg, " Daily      PRESERVISION AREDS 2 PO   2 tablets, Oral, 2 Times Daily      Breo Ellipta 100-25 MCG/INH aerosol powder   Generic drug: Fluticasone Furoate-Vilanterol   1 puff, As Needed      clotrimazole-betamethasone 1-0.05 % cream  Commonly known as: LOTRISONE   APPLY 1 GRAM TOPICALLY THREE TIMES DAILY      fluticasone 50 MCG/ACT nasal spray  Commonly known as: FLONASE   2 sprays, Daily      furosemide 20 MG tablet  Commonly known as: LASIX   20 mg, Oral, Daily      Gemtesa 75 MG tablet  Generic drug: Vibegron   75 mg, Daily      glimepiride 4 MG tablet  Commonly known as: AMARYL   4 mg, Every Morning Before Breakfast      Jardiance 25 MG tablet tablet  Generic drug: empagliflozin   1 tablet, Daily      lisinopril 20 MG tablet  Commonly known as: PRINIVIL,ZESTRIL   20 mg, 2 Times Daily      meloxicam 15 MG tablet  Commonly known as: MOBIC   15 mg, As Needed      metFORMIN 500 MG tablet  Commonly known as: GLUCOPHAGE   1,000 mg, 2 Times Daily With Meals      montelukast 10 MG tablet  Commonly known as: SINGULAIR   10 mg, Oral, Nightly      nitroglycerin 0.4 MG SL tablet  Commonly known as: NITROSTAT   0.4 mg, As Needed      Omega 3 1000 MG capsule   2 capsules, Daily      pioglitazone 30 MG tablet  Commonly known as: ACTOS   30 mg, Oral, Daily      potassium chloride 10 MEQ CR tablet   10 mEq, Oral, Daily      Stool Softener 100 MG capsule  Generic drug: docusate sodium   100 mg, Oral, 2 Times Daily      tamsulosin 0.4 MG capsule 24 hr capsule  Commonly known as: FLOMAX   0.8 mg, Oral, Daily      Ventolin  (90 Base) MCG/ACT inhaler  Generic drug: albuterol sulfate HFA   1 puff, As Needed      vitamin B-12 500 MCG tablet  Commonly known as: CYANOCOBALAMIN   1,000 mcg, Daily      Vitamin D3 50 MCG (2000 UT) chewable tablet   2 capsules, Daily             Stop These Medications      glyburide 5 MG tablet  Commonly known as: DIAbeta     ibuprofen 400 MG tablet  Commonly known as: ADVIL,MOTRIN     tolterodine 1 MG  tablet  Commonly known as: DETROL            ASK your doctor about these medications        Instructions Start Date   aspirin 81 MG EC tablet   81 mg, Daily      b complex-C-E-zinc tablet   1 tablet, Daily      doxepin 10 MG capsule  Commonly known as: SINEquan                Discharge Diet: resume prior.     Activity at Discharge:   NWB RUE, ROM elbow, wrist, and hand per 's instructions.       Future Appointments   Date Time Provider Department Center   11/25/2025  9:45 AM Bradley Sandhu III, MD [facility]       Steven Raphael MD per his orders     Anibal Vasquez MD  05/24/25  09:09 EDT

## 2025-05-24 NOTE — OUTREACH NOTE
Prep Survey      Flowsheet Row Responses   [facility] patient discharged from? [place]   Is LACE score < 7 ? No   Eligibility Readm Mgmt   Discharge diagnosis s/p Total replacement of right shoulder   Does the patient have one of the following disease processes/diagnoses(primary or secondary)? Total Joint Replacement   Does the patient have Home health ordered? No   Is there a DME ordered? No   Prep survey completed? Yes            ROMARIO FINK - Registered Nurse

## 2025-05-29 ENCOUNTER — READMISSION MANAGEMENT (OUTPATIENT)
Dept: CALL CENTER | Facility: HOSPITAL | Age: 85
End: 2025-05-29
Payer: MEDICARE

## 2025-05-29 NOTE — OUTREACH NOTE
Total Joint Week 1 Survey      Flowsheet Row Responses   [facility] patient discharged from? [place]   Does the patient have one of the following disease processes/diagnoses(primary or secondary)? Total Joint Replacement   Joint surgery performed? Shoulder   Week 1 attempt successful? Yes   Call start time 1515   Call end time 1519   Discharge diagnosis s/p Total replacement of right shoulder   Does the patient have all medications related to this admission filled (includes all antibiotics, pain medications, etc.) Yes   Is the patient taking all medications as directed (includes completed medication regime)? Yes   Is the patient able to teach back alternate methods of pain control? Shoulder-elevate above heart/ keep in sling as advised, Correct alignment   Does the patient have a follow up appointment with their surgeon? Yes   Comments Patient states follow up with surgeon 6/4   Psychosocial issues? No   Has the patient fallen since discharge? No   Did the patient receive a copy of their discharge instructions? Yes   Nursing interventions Reviewed instructions with patient   What is the patient's perception of their functional status since discharge? Improving   Is the patient able to teach back signs and symptoms of infection? Temp >100.4 for 24h or longer, Incisional drainage, Blisters around incision, Increased swelling or redness around incision (not associated with surgical edema), Severe discomfort or pain, Changes in mobility, Shortness of breath or chest pain   Is the patient/caregiver able to teach back the hierarchy of who to call/visit for symptoms/problems? PCP, Specialist, Home health nurse, Urgent Care, ED, 911 Yes   Week 1 call completed? Yes   Is the patient interested in additional calls from an ambulatory ? No   Would this patient benefit from a Referral to Amb Social Work? No   Wrap up additional comments Patient reports doing well. No questions or concerns at this time.    Call end time [id number]            IRAJ WILSON - Registered Nurse

## 2025-06-10 ENCOUNTER — READMISSION MANAGEMENT (OUTPATIENT)
Dept: CALL CENTER | Facility: HOSPITAL | Age: 85
End: 2025-06-10
Payer: MEDICARE

## 2025-06-10 NOTE — OUTREACH NOTE
Total Joint Week 2 Survey      Flowsheet Row Responses   [facility] [place] by [facility] patient discharged from? [place]   Does the patient have one of the following disease processes/diagnoses(primary or secondary)? Total Joint Replacement   Joint surgery performed? Shoulder   Week 2 attempt successful? Yes   Call start time 1408   Call end time 1412   Has the patient been back in either the hospital or Emergency Department since discharge? No   Discharge diagnosis s/p Total replacement of right shoulder   Does the patient have all medications related to this admission filled (includes all antibiotics, pain medications, etc.) Yes   Is the patient taking all medications as directed (includes completed medication regime)? Yes   Does the patient have a follow up appointment with their surgeon? Yes   Has the patient kept scheduled appointments due by today? Yes   Has home health visited the patient within 72 hours of discharge? N/A   Psychosocial issues? No   Has the patient began therapy sessions (either in the home or as an out patient)? No   Does the patient have a wound vac in place? N/A   Has the patient fallen since discharge? No   What is the patient's perception of their functional status since discharge? Improving   Is the patient able to teach back how to prevent infection? Check incision daily   Is the patient/caregiver able to teach back the hierarchy of who to call/visit for symptoms/problems? PCP, Specialist, Home health nurse, Urgent Care, ED, 911 Yes   Week 2 call completed? Yes   Is the patient interested in additional calls from an ambulatory ? No   Would this patient benefit from a Referral to Amb Social Work? No   Wrap up additional comments Doing great, denies any questions or concerns, seen his surgeon last week.   Call end time 1412            Charlotte RIOS - Registered Nurse

## 2025-06-26 ENCOUNTER — READMISSION MANAGEMENT (OUTPATIENT)
Dept: CALL CENTER | Facility: HOSPITAL | Age: 85
End: 2025-06-26
Payer: MEDICARE

## 2025-06-26 NOTE — OUTREACH NOTE
Total Joint Month 1 Survey      Flowsheet Row Responses   [facility] patient discharged from? [place]   Does the patient have one of the following disease processes/diagnoses(primary or secondary)? Total Joint Replacement   Joint surgery performed? Shoulder   Month 1 attempt successful? No   Unsuccessful attempts Attempt 1            Maria Dolores STEIN - Registered Nurse

## 2025-06-27 ENCOUNTER — READMISSION MANAGEMENT (OUTPATIENT)
Dept: CALL CENTER | Facility: HOSPITAL | Age: 85
End: 2025-06-27
Payer: MEDICARE

## 2025-06-27 NOTE — OUTREACH NOTE
Total Joint Month 1 Survey      Flowsheet Row Responses   [facility] patient discharged from? [place]   Does the patient have one of the following disease processes/diagnoses(primary or secondary)? Total Joint Replacement   Joint surgery performed? Shoulder   Month 1 attempt successful? Yes   Call start time 1517   Call end time 1519   Has the patient been back in either the hospital or Emergency Department since discharge? No   Person spoke with today (if not patient) and relationship pt   Is the patient taking all medications as directed (includes completed medication regime)? Yes   Is the patient able to teach back alternate methods of pain control? Shoulder-elevate above heart/ keep in sling as advised, Correct alignment   Has the patient kept scheduled appointments due by today? Yes   Is the patient still attending therapy sessions(either in the home or as an outpatient)? Yes   Has the patient fallen since discharge? No   If the patient has fallen, were there any injuries? No   What is the patient's perception of their functional status since discharge? Improving   Is the patient able to teach back signs and symptoms of infection? Temp >100.4 for 24h or longer, Incisional drainage, Blisters around incision, Increased swelling or redness around incision (not associated with surgical edema), Severe discomfort or pain, Changes in mobility, Shortness of breath or chest pain   If the patient is a current smoker, are they able to teach back resources for cessation? Not a smoker   Is the patient/caregiver able to teach back the hierarchy of who to call/visit for symptoms/problems? PCP, Specialist, Home health nurse, Urgent Care, ED, 911 Yes   Month 1 call completed? Yes   Graduated Yes   Is the patient interested in additional calls from an ambulatory ? No   Would this patient benefit from a Referral to [facility] Social Work? No   Wrap up additional comments doing great pt stated he has no needs at this  time   Call end time 1512            MONCHO STEIN - Registered Nurse

## (undated) DEVICE — 3M™ IOBAN™ 2 ANTIMICROBIAL INCISE DRAPE 6651EZ: Brand: IOBAN™ 2

## (undated) DEVICE — UNDERGLV SURG BIOGEL INDICAT PI SZ8 BLU

## (undated) DEVICE — T-MAX DISPOSABLE FACE MASK 8 PER BOX

## (undated) DEVICE — SPNG GZ WOVN 4X4IN 12PLY 10/BX STRL

## (undated) DEVICE — SUT VIC 2/0 CT2 27IN J269H

## (undated) DEVICE — DRP SURG U/DRP INVISISHIELD PA 48X52IN

## (undated) DEVICE — SKN PREP SPNG STKS PVP PNT STR: Brand: MEDLINE INDUSTRIES, INC.

## (undated) DEVICE — PUMP PAIN AUTOFUSER AUTO SELCT NOBOLUS 1TO14ML/HR 550ML DISP

## (undated) DEVICE — SOL ISO/ALC RUB 70PCT 4OZ

## (undated) DEVICE — CATH DIAG EXPO .045 FL3.5 5F 100CM

## (undated) DEVICE — INTENDED TO SUPPORT AND MAINTAIN THE POSITION OF AN ANESTHETIZED PATIENT DURING SURGERY: Brand: ERIN BEACH CHAIR FACE MASK

## (undated) DEVICE — MODEL AT P65, P/N 701554-001KIT CONTENTS: HAND CONTROLLER, 3-WAY HIGH-PRESSURE STOPCOCK WITH ROTATING END AND PREMIUM HIGH-PRESSURE TUBING: Brand: ANGIOTOUCH® KIT

## (undated) DEVICE — ELECTRD BLD EZ CLN STD 6.5IN

## (undated) DEVICE — INTRO SHEATH PRELUDE EASE HC .025 6F 11CM

## (undated) DEVICE — CATH DIAG EXPO M/ PK 5F FL4/FR4 PIG

## (undated) DEVICE — BONE PREPARATION KIT: Brand: BIOPREP

## (undated) DEVICE — CEMENT MIXING SYSTEM WITH MIS FEMORAL BREAKAWAY NOZZLE: Brand: REVOLUTION

## (undated) DEVICE — A2000 MULTI-USE SYRINGE KIT, P/N 701277-003KIT CONTENTS: 100ML CONTRAST RESERVOIR AND TUBING WITH CONTRAST SPIKE AND CLAMP: Brand: A2000 MULTI-USE SYRINGE KIT

## (undated) DEVICE — UNDERGLV SURG BIOGEL INDICAT PF 61/2 GRN

## (undated) DEVICE — DRAPE,REIN 53X77,STERILE: Brand: MEDLINE

## (undated) DEVICE — INTENDED FOR TISSUE SEPARATION, AND OTHER PROCEDURES THAT REQUIRE A SHARP SURGICAL BLADE TO PUNCTURE OR CUT.: Brand: BARD-PARKER ® CARBON RIB-BACK BLADES

## (undated) DEVICE — GLV SURG SENSICARE PI MIC PF SZ7.5 LF STRL

## (undated) DEVICE — PK MAJ SHLDR SPLT 10

## (undated) DEVICE — PULLOVER TOGA, 2X LARGE: Brand: FLYTE, SURGICOOL

## (undated) DEVICE — BLANKT WARM LOWR/BDY 100X120CM

## (undated) DEVICE — GLV SURG SENSICARE PI ORTHO SZ7.5 LF STRL

## (undated) DEVICE — Device

## (undated) DEVICE — PREVENA INCISION MANAGEMENT SYSTEM- PEEL & PLACE DRESSING: Brand: PREVENA™ PEEL & PLACE™

## (undated) DEVICE — DEV COMP RAD PRELUDESYNC 29CM

## (undated) DEVICE — PATIENT RETURN ELECTRODE, SINGLE-USE, CONTACT QUALITY MONITORING, ADULT, WITH 9FT CORD, FOR PATIENTS WEIGING OVER 33LBS. (15KG): Brand: MEGADYNE

## (undated) DEVICE — SLNG ULTRSLING3 13TO15IN LG

## (undated) DEVICE — SOL ANTISEP SCRB CHG 4PCT 4OZ

## (undated) DEVICE — CVR HNDL LIGHT RIGID

## (undated) DEVICE — GW EXCHG TSCF .035 260CM 3MM

## (undated) DEVICE — ST PIN FIX TEMP UNIVERSREVERS W/BRKAWAY/GUIDE/PIN 2.4MM STRL

## (undated) DEVICE — GLV SURG PREMIERPRO MIC LTX PF SZ6.5 BRN

## (undated) DEVICE — DRAPE,TOWEL,LARGE,INVISISHIELD: Brand: MEDLINE

## (undated) DEVICE — ELECTRD BLD EZ CLN STD 2.5IN

## (undated) DEVICE — 1010 S-DRAPE TOWEL DRAPE 10/BX: Brand: STERI-DRAPE™

## (undated) DEVICE — RADIFOCUS GLIDEWIRE: Brand: GLIDEWIRE

## (undated) DEVICE — KT PUMP INFUBLOCK MDL 2100 PMKITSOLIS

## (undated) DEVICE — ANTIBACTERIAL UNDYED BRAIDED (POLYGLACTIN 910), SYNTHETIC ABSORBABLE SUTURE: Brand: COATED VICRYL

## (undated) DEVICE — 3M™ STERI-DRAPE™ U-DRAPE 1015: Brand: STERI-DRAPE™

## (undated) DEVICE — PENCL SMOKE/EVAC MEGADYNE TELESCP 10FT

## (undated) DEVICE — PK CATH CARD 10

## (undated) DEVICE — THIN FLEXIBLE NOZZLE: Brand: REVOLUTION

## (undated) DEVICE — SUT ETHLN 3/0 FS1 30IN 669H

## (undated) DEVICE — SOL ISO/ALC 70PCT 16OZ

## (undated) DEVICE — CANNULA,ADULT,SOFT-TOUCH,7'TUBE,UC: Brand: PENDING

## (undated) DEVICE — MODEL BT2000 P/N 700287-012KIT CONTENTS: MANIFOLD WITH SALINE AND CONTRAST PORTS, SALINE TUBING WITH SPIKE AND HAND SYRINGE, TRANSDUCER: Brand: BT2000 AUTOMATED MANIFOLD KIT

## (undated) DEVICE — BIT DRL UNIVERS REVERS MODULAR 3MM

## (undated) DEVICE — 450 ML BOTTLE OF 0.05% CHLORHEXIDINE GLUCONATE IN 99.95% STERILE WATER FOR IRRIGATION, USP AND APPLICATOR.: Brand: IRRISEPT ANTIMICROBIAL WOUND LAVAGE

## (undated) DEVICE — SWABSTK SKINPREP PVPI PRE/SAT 8IN STRL

## (undated) DEVICE — DRSNG SURG AQUACEL AG 9X25CM